# Patient Record
Sex: MALE | Race: WHITE | NOT HISPANIC OR LATINO | Employment: FULL TIME | URBAN - METROPOLITAN AREA
[De-identification: names, ages, dates, MRNs, and addresses within clinical notes are randomized per-mention and may not be internally consistent; named-entity substitution may affect disease eponyms.]

---

## 2018-04-11 ENCOUNTER — OFFICE VISIT (OUTPATIENT)
Dept: URGENT CARE | Facility: CLINIC | Age: 38
End: 2018-04-11
Payer: COMMERCIAL

## 2018-04-11 VITALS
HEART RATE: 74 BPM | SYSTOLIC BLOOD PRESSURE: 102 MMHG | WEIGHT: 158 LBS | TEMPERATURE: 98.2 F | DIASTOLIC BLOOD PRESSURE: 72 MMHG | HEIGHT: 72 IN | BODY MASS INDEX: 21.4 KG/M2 | RESPIRATION RATE: 14 BRPM | OXYGEN SATURATION: 98 %

## 2018-04-11 DIAGNOSIS — B96.89 ACUTE BACTERIAL RHINOSINUSITIS: ICD-10-CM

## 2018-04-11 DIAGNOSIS — J01.90 ACUTE BACTERIAL RHINOSINUSITIS: ICD-10-CM

## 2018-04-11 PROCEDURE — 99203 OFFICE O/P NEW LOW 30 MIN: CPT | Performed by: EMERGENCY MEDICINE

## 2018-04-11 RX ORDER — AMOXICILLIN AND CLAVULANATE POTASSIUM 875; 125 MG/1; MG/1
1 TABLET, FILM COATED ORAL 2 TIMES DAILY
Qty: 14 TAB | Refills: 0 | Status: SHIPPED | OUTPATIENT
Start: 2018-04-11 | End: 2018-04-18

## 2018-04-11 ASSESSMENT — ENCOUNTER SYMPTOMS
ABDOMINAL PAIN: 0
NAUSEA: 0
VOMITING: 0
SINUS PRESSURE: 1
DIARRHEA: 0
SORE THROAT: 0
COUGH: 0
SHORTNESS OF BREATH: 0
SINUS PAIN: 1
HEADACHES: 0

## 2018-04-11 NOTE — PROGRESS NOTES
Subjective:      Lyndon Sigala is a 38 y.o. male who presents with Sinus Problem            Sinus Problem   This is a recurrent problem. The current episode started more than 1 month ago. The problem has been rapidly worsening since onset. There has been no fever. The pain is moderate. Associated symptoms include congestion and sinus pressure. Pertinent negatives include no coughing, ear pain, headaches, shortness of breath, sneezing or sore throat. Past treatments include nothing.   Notes multiple coryza illnesses over the last several weeks; worsened the last several days    Review of Systems   Constitutional: Positive for malaise/fatigue.   HENT: Positive for congestion, nosebleeds, sinus pain and sinus pressure. Negative for ear pain, sneezing and sore throat.         Purulent nasal discharge noted.   Respiratory: Negative for cough and shortness of breath.    Cardiovascular: Negative for chest pain.   Gastrointestinal: Negative for abdominal pain, diarrhea, nausea and vomiting.   Skin: Negative for rash.   Neurological: Negative for headaches.   Endo/Heme/Allergies: Negative for environmental allergies.     PMH:  has no past medical history on file.  MEDS:   Current Outpatient Prescriptions:   •  amoxicillin-clavulanate (AUGMENTIN) 875-125 MG Tab, Take 1 Tab by mouth 2 times a day for 7 days., Disp: 14 Tab, Rfl: 0  •  azithromycin (ZITHROMAX) 250 MG Tab, Take as directed, Disp: 6 Tab, Rfl: 0  •  neomycin sulf/polymyx B sulf/HC soln (CORTISPORIN HC SOL) 3.5-55319-5 SOLN, Place 4-6 Drops in left ear 4 times a day. In affected ear, Disp: 10 mL, Rfl: 0  •  ibuprofen (MOTRIN) 200 MG TABS, Take 600 mg by mouth every 6 hours as needed., Disp: , Rfl:   •  pseudoephedrine-guaifenesin (MUCINEX D)  MG per tablet, Take 1 Tab by mouth every 12 hours., Disp: 14 Tab, Rfl: 0  •  naproxen (NAPROSYN) 500 MG TABS, Take 1 Tab by mouth 2 times a day, with meals., Disp: 24 Each, Rfl: 0  ALLERGIES: No Known  Allergies  SURGHX: History reviewed. No pertinent surgical history.  SOCHX:  reports that he has never smoked. He does not have any smokeless tobacco history on file.  FH: family history is not on file.       Objective:     /72   Pulse 74   Temp 36.8 °C (98.2 °F)   Resp 14   Ht 1.829 m (6')   Wt 71.7 kg (158 lb)   SpO2 98%   BMI 21.43 kg/m²      Physical Exam   Constitutional: He appears well-developed and well-nourished. He is cooperative.  Non-toxic appearance. He does not appear ill. No distress.   HENT:   Head: Normocephalic.   Right Ear: Tympanic membrane and ear canal normal.   Left Ear: Tympanic membrane and ear canal normal.   Nose: Mucosal edema and rhinorrhea present.   Mouth/Throat: Uvula is midline and mucous membranes are normal. No trismus in the jaw. Oropharyngeal exudate present. No posterior oropharyngeal edema or posterior oropharyngeal erythema.   Eyes: Conjunctivae are normal.   Neck: Trachea normal. Neck supple.   Cardiovascular: Normal rate, regular rhythm and normal heart sounds.    Pulmonary/Chest: Effort normal and breath sounds normal.   Lymphadenopathy:     He has no cervical adenopathy.   Neurological: He is alert.   Skin: Skin is warm and dry.   Psychiatric: He has a normal mood and affect.               Assessment/Plan:     1. Acute bacterial rhinosinusitis  Rx Augmentin  Recommended supportive care measures, including rest, increasing oral fluid intake and use of over-the-counter medications for relief of symptoms.

## 2018-04-11 NOTE — PATIENT INSTRUCTIONS
Use behind-the-counter pseudoephedrine (Sudafed) oral decongestant as needed; avoid bedtime use.  Use over-the-counter oxymetazoline (Afrin) nasal spray as needed for up to 3 days only; follow package instructions for dosing.  Use an oral probiotic daily, such as Culturelle, Align, or yogurt to reduce gastrointestinal symptoms.  Use over-the-counter pain reliever, such as acetaminophen (Tylenol), ibuprofen (Advil, Motrin) or naproxen (Aleve) as needed; follow package directions for dosing.  Sinusitis, Adult  Sinusitis is soreness and inflammation of your sinuses. Sinuses are hollow spaces in the bones around your face. Your sinuses are located:  · Around your eyes.  · In the middle of your forehead.  · Behind your nose.  · In your cheekbones.  Your sinuses and nasal passages are lined with a stringy fluid (mucus). Mucus normally drains out of your sinuses. When your nasal tissues become inflamed or swollen, the mucus can become trapped or blocked so air cannot flow through your sinuses. This allows bacteria, viruses, and funguses to grow, which leads to infection.  Sinusitis can develop quickly and last for 7?10 days (acute) or for more than 12 weeks (chronic). Sinusitis often develops after a cold.  What are the causes?  This condition is caused by anything that creates swelling in the sinuses or stops mucus from draining, including:  · Allergies.  · Asthma.  · Bacterial or viral infection.  · Abnormally shaped bones between the nasal passages.  · Nasal growths that contain mucus (nasal polyps).  · Narrow sinus openings.  · Pollutants, such as chemicals or irritants in the air.  · A foreign object stuck in the nose.  · A fungal infection. This is rare.  What increases the risk?  The following factors may make you more likely to develop this condition:  · Having allergies or asthma.  · Having had a recent cold or respiratory tract infection.  · Having structural deformities or blockages in your nose or  sinuses.  · Having a weak immune system.  · Doing a lot of swimming or diving.  · Overusing nasal sprays.  · Smoking.  What are the signs or symptoms?  The main symptoms of this condition are pain and a feeling of pressure around the affected sinuses. Other symptoms include:  · Upper toothache.  · Earache.  · Headache.  · Bad breath.  · Decreased sense of smell and taste.  · A cough that may get worse at night.  · Fatigue.  · Fever.  · Thick drainage from your nose. The drainage is often green and it may contain pus (purulent).  · Stuffy nose or congestion.  · Postnasal drip. This is when extra mucus collects in the throat or back of the nose.  · Swelling and warmth over the affected sinuses.  · Sore throat.  · Sensitivity to light.  How is this diagnosed?  This condition is diagnosed based on symptoms, a medical history, and a physical exam. To find out if your condition is acute or chronic, your health care provider may:  · Look in your nose for signs of nasal polyps.  · Tap over the affected sinus to check for signs of infection.  · View the inside of your sinuses using an imaging device that has a light attached (endoscope).  If your health care provider suspects that you have chronic sinusitis, you may also:  · Be tested for allergies.  · Have a sample of mucus taken from your nose (nasal culture) and checked for bacteria.  · Have a mucus sample examined to see if your sinusitis is related to an allergy.  If your sinusitis does not respond to treatment and it lasts longer than 8 weeks, you may have an MRI or CT scan to check your sinuses. These scans also help to determine how severe your infection is.  In rare cases, a bone biopsy may be done to rule out more serious types of fungal sinus disease.  How is this treated?  Treatment for sinusitis depends on the cause and whether your condition is chronic or acute. If a virus is causing your sinusitis, your symptoms will go away on their own within 10 days. You  may be given medicines to relieve your symptoms, including:  · Topical nasal decongestants. They shrink swollen nasal passages and let mucus drain from your sinuses.  · Antihistamines. These drugs block inflammation that is triggered by allergies. This can help to ease swelling in your nose and sinuses.  · Topical nasal corticosteroids. These are nasal sprays that ease inflammation and swelling in your nose and sinuses.  · Nasal saline washes. These rinses can help to get rid of thick mucus in your nose.  If your condition is caused by bacteria, you will be given an antibiotic medicine. If your condition is caused by a fungus, you will be given an antifungal medicine.  Surgery may be needed to correct underlying conditions, such as narrow nasal passages. Surgery may also be needed to remove polyps.  Follow these instructions at home:  Medicines  · Take, use, or apply over-the-counter and prescription medicines only as told by your health care provider. These may include nasal sprays.  · If you were prescribed an antibiotic medicine, take it as told by your health care provider. Do not stop taking the antibiotic even if you start to feel better.  Hydrate and Humidify  · Drink enough water to keep your urine clear or pale yellow. Staying hydrated will help to thin your mucus.  · Use a cool mist humidifier to keep the humidity level in your home above 50%.  · Inhale steam for 10-15 minutes, 3-4 times a day or as told by your health care provider. You can do this in the bathroom while a hot shower is running.  · Limit your exposure to cool or dry air.  Rest  · Rest as much as possible.  · Sleep with your head raised (elevated).  · Make sure to get enough sleep each night.  General instructions  · Apply a warm, moist washcloth to your face 3-4 times a day or as told by your health care provider. This will help with discomfort.  · Wash your hands often with soap and water to reduce your exposure to viruses and other  germs. If soap and water are not available, use hand .  · Do not smoke. Avoid being around people who are smoking (secondhand smoke).  · Keep all follow-up visits as told by your health care provider. This is important.  Contact a health care provider if:  · You have a fever.  · Your symptoms get worse.  · Your symptoms do not improve within 10 days.  Get help right away if:  · You have a severe headache.  · You have persistent vomiting.  · You have pain or swelling around your face or eyes.  · You have vision problems.  · You develop confusion.  · Your neck is stiff.  · You have trouble breathing.  This information is not intended to replace advice given to you by your health care provider. Make sure you discuss any questions you have with your health care provider.  Document Released: 12/18/2006 Document Revised: 08/13/2017 Document Reviewed: 10/12/2016  [employer] Interactive Patient Education © 2017 [employer]

## 2018-09-16 ENCOUNTER — OFFICE VISIT (OUTPATIENT)
Dept: URGENT CARE | Facility: CLINIC | Age: 38
End: 2018-09-16
Payer: COMMERCIAL

## 2018-09-16 VITALS
RESPIRATION RATE: 14 BRPM | HEIGHT: 72 IN | HEART RATE: 52 BPM | SYSTOLIC BLOOD PRESSURE: 114 MMHG | WEIGHT: 160 LBS | BODY MASS INDEX: 21.67 KG/M2 | DIASTOLIC BLOOD PRESSURE: 78 MMHG | TEMPERATURE: 98.1 F | OXYGEN SATURATION: 97 %

## 2018-09-16 DIAGNOSIS — H93.8X2 SENSATION OF FULLNESS IN LEFT EAR: ICD-10-CM

## 2018-09-16 DIAGNOSIS — H61.22 IMPACTED CERUMEN OF LEFT EAR: ICD-10-CM

## 2018-09-16 PROCEDURE — 99213 OFFICE O/P EST LOW 20 MIN: CPT | Performed by: NURSE PRACTITIONER

## 2018-09-16 ASSESSMENT — ENCOUNTER SYMPTOMS
DIZZINESS: 0
CHILLS: 0
NAUSEA: 0
FEVER: 0
VOMITING: 0

## 2018-09-16 NOTE — PROGRESS NOTES
Subjective:      Lyndon Sigala is a 38 y.o. male who presents with Ear Fullness (x1mo left ear fullness, muffled )    History reviewed. No pertinent past medical history.  Social History     Social History   • Marital status: Single     Spouse name: N/A   • Number of children: N/A   • Years of education: N/A     Occupational History   • Not on file.     Social History Main Topics   • Smoking status: Former Smoker     Types: Cigarettes     Quit date: 9/16/2012   • Smokeless tobacco: Never Used   • Alcohol use Not on file   • Drug use: Unknown   • Sexual activity: Not on file     Other Topics Concern   • Not on file     Social History Narrative   • No narrative on file     History reviewed. No pertinent family history.    Allergies: Patient has no known allergies.            Ear Fullness   This is a recurrent problem. The current episode started in the past 7 days. The problem occurs constantly. The problem has been unchanged. Pertinent negatives include no chills, fever, nausea or vomiting. Associated symptoms comments: Left ear fullness . Nothing aggravates the symptoms. He has tried nothing for the symptoms. The treatment provided no relief.       Review of Systems   Constitutional: Negative for chills, fever and malaise/fatigue.   HENT: Positive for hearing loss. Negative for ear pain.    Gastrointestinal: Negative for nausea and vomiting.   Skin: Negative.    Neurological: Negative for dizziness.   All other systems reviewed and are negative.         Objective:     /78   Pulse (!) 52   Temp 36.7 °C (98.1 °F)   Resp 14   Ht 1.829 m (6')   Wt 72.6 kg (160 lb)   SpO2 97%   BMI 21.70 kg/m²      Physical Exam   Constitutional: He appears well-developed and well-nourished. No distress.   HENT:   Head: Normocephalic.   Right Ear: External ear normal.   Left EAC is filled with impacted cerumen   Eyes: Pupils are equal, round, and reactive to light. EOM are normal.   Neck: Normal range of motion. Neck  supple.   Skin: He is not diaphoretic.   Vitals reviewed.    Post lavage: large amount of cerumen removed from the left EAC; TM is clear, no erythema noted.          Assessment/Plan:     1. Sensation of fullness in left ear  2. Impacted cerumen of left ear  -Follow up as needed for recurrent symptoms.

## 2020-02-11 ENCOUNTER — OFFICE VISIT (OUTPATIENT)
Dept: URGENT CARE | Facility: CLINIC | Age: 40
End: 2020-02-11
Payer: COMMERCIAL

## 2020-02-11 VITALS
DIASTOLIC BLOOD PRESSURE: 78 MMHG | BODY MASS INDEX: 22.35 KG/M2 | RESPIRATION RATE: 17 BRPM | HEART RATE: 98 BPM | HEIGHT: 72 IN | WEIGHT: 165 LBS | SYSTOLIC BLOOD PRESSURE: 104 MMHG | OXYGEN SATURATION: 98 % | TEMPERATURE: 98.6 F

## 2020-02-11 DIAGNOSIS — J10.1 INFLUENZA A: Primary | ICD-10-CM

## 2020-02-11 DIAGNOSIS — J06.9 URI WITH COUGH AND CONGESTION: ICD-10-CM

## 2020-02-11 LAB
FLUAV+FLUBV AG SPEC QL IA: NORMAL
INT CON NEG: NORMAL
INT CON POS: NORMAL

## 2020-02-11 PROCEDURE — 87804 INFLUENZA ASSAY W/OPTIC: CPT | Performed by: PHYSICIAN ASSISTANT

## 2020-02-11 PROCEDURE — 99214 OFFICE O/P EST MOD 30 MIN: CPT | Performed by: PHYSICIAN ASSISTANT

## 2020-02-11 RX ORDER — METHYLPREDNISOLONE 4 MG/1
TABLET ORAL
Qty: 21 TAB | Refills: 0 | Status: ON HOLD | OUTPATIENT
Start: 2020-02-11 | End: 2020-09-15

## 2020-02-11 RX ORDER — OSELTAMIVIR PHOSPHATE 75 MG/1
75 CAPSULE ORAL 2 TIMES DAILY
Qty: 10 CAP | Refills: 0 | Status: SHIPPED | OUTPATIENT
Start: 2020-02-11 | End: 2020-02-16

## 2020-02-11 RX ORDER — PROMETHAZINE HYDROCHLORIDE AND CODEINE PHOSPHATE 6.25; 1 MG/5ML; MG/5ML
5 SYRUP ORAL 4 TIMES DAILY PRN
Qty: 120 ML | Refills: 0 | Status: SHIPPED | OUTPATIENT
Start: 2020-02-11 | End: 2020-02-18

## 2020-02-11 NOTE — PROGRESS NOTES
Subjective:      Pt is a 39 y.o. male who presents with Fever (fevers x 2days runny nose , post nasal drip )            HPI  This is a new problem. PT presents to  clinic today complaining of sore throat, fevers, chills, body aches, watery eyes, pressure in ears, cough, fatigue, runny nose. PT denies CP, SOB, NVD, abdominal pain, joint pain. PT states these symptoms began around 2 days ago. PT states the pain is a 7/10, aching in nature and worse at night.  Pt has not taken any RX medications for this condition. The pt's medication list, problem list, and allergies have been evaluated and reviewed during today's visit.      PMH:  Negative per pt.      PSH:  Negative per pt.      Fam Hx:  the patient's family history is not pertinent to their current complaint    Soc HX:  Social History     Socioeconomic History   • Marital status: Single     Spouse name: Not on file   • Number of children: Not on file   • Years of education: Not on file   • Highest education level: Not on file   Occupational History   • Not on file   Social Needs   • Financial resource strain: Not on file   • Food insecurity:     Worry: Not on file     Inability: Not on file   • Transportation needs:     Medical: Not on file     Non-medical: Not on file   Tobacco Use   • Smoking status: Former Smoker     Packs/day: 0.00     Types: Cigarettes     Last attempt to quit: 2012     Years since quittin.4   • Smokeless tobacco: Never Used   Substance and Sexual Activity   • Alcohol use: Not on file   • Drug use: Not on file   • Sexual activity: Not on file   Lifestyle   • Physical activity:     Days per week: Not on file     Minutes per session: Not on file   • Stress: Not on file   Relationships   • Social connections:     Talks on phone: Not on file     Gets together: Not on file     Attends Yazidism service: Not on file     Active member of club or organization: Not on file     Attends meetings of clubs or organizations: Not on file      Relationship status: Not on file   • Intimate partner violence:     Fear of current or ex partner: Not on file     Emotionally abused: Not on file     Physically abused: Not on file     Forced sexual activity: Not on file   Other Topics Concern   • Not on file   Social History Narrative   • Not on file         Medications:    Current Outpatient Medications:   •  ibuprofen (MOTRIN) 200 MG TABS, Take 600 mg by mouth every 6 hours as needed., Disp: , Rfl:       Allergies:  Patient has no known allergies.    ROS  Constitutional: Positive for chills, fevers, body aches and malaise/fatigue.   HENT: Positive for congestion and sore throat. Negative for ear pain.    Eyes: Negative for blurred vision, double vision and photophobia.   Respiratory: Positive for cough and sputum production. Negative for hemoptysis, shortness of breath and wheezing.    Cardiovascular: Negative for chest pain and palpitations.   Gastrointestinal: Negative for nausea, vomiting, abdominal pain, diarrhea and constipation.   Genitourinary: Negative for dysuria and flank pain.   Musculoskeletal: Negative for falls and myalgias.   Skin: Negative for itching and rash.   Neurological: Positive for headaches. Negative for dizziness and tingling.   Endo/Heme/Allergies: Does not bruise/bleed easily.   Psychiatric/Behavioral: Negative for depression. The patient is not nervous/anxious.             Objective:     /78 (BP Location: Left arm, Patient Position: Sitting, BP Cuff Size: Adult)   Pulse 98   Temp 37 °C (98.6 °F) (Temporal)   Resp 17   Ht 1.829 m (6')   Wt 74.8 kg (165 lb)   SpO2 98%   BMI 22.38 kg/m²      Physical Exam  Constitutional: PT is oriented to person, place, and time. PT appears well-developed and well-nourished. No distress.   HENT:   Head: Normocephalic and atraumatic.   Right Ear: Hearing, tympanic membrane, external ear and ear canal normal.   Left Ear: Hearing, tympanic membrane, external ear and ear canal normal.   Nose:  Mucosal edema, rhinorrhea and sinus tenderness present. Right sinus exhibits frontal sinus tenderness. Left sinus exhibits frontal sinus tenderness.   Mouth/Throat: Uvula is midline. Mucous membranes are pale. Posterior oropharyngeal edema and posterior oropharyngeal erythema with exudate noted on exam.   Eyes: Conjunctivae normal and EOM are normal. Pupils are equal, round, and reactive to light.   Neck: Normal range of motion. Neck supple. No thyromegaly present.   Cardiovascular: Normal rate, regular rhythm, normal heart sounds and intact distal pulses.  Exam reveals no gallop and no friction rub.    No murmur heard.  Pulmonary/Chest: Effort normal and breath sounds normal. No respiratory distress. PT has no wheezes. PT has no rales. PT exhibits no tenderness.   Abdominal: Soft. Bowel sounds are normal. PT exhibits no distension and no mass. There is no tenderness. There is no rebound and no guarding.   Musculoskeletal: Normal range of motion. PT exhibits no edema and no tenderness.   Lymphadenopathy:     PT has no cervical adenopathy.   Neurological: PT is alert and oriented to person, place, and time. PT displays normal reflexes. No cranial nerve deficit. PT exhibits normal muscle tone. Coordination normal.   Skin: Skin is warm and dry. No rash noted. No erythema.   Psychiatric: PT has a normal mood and affect. PT behavior is normal. Judgment and thought content normal.        Assessment/Plan:       1. Influenza A    2. URI with cough and congestion    - POCT Influenza A/B-->POS A    Tamiflu  Medrol pack  Codeine cough syrup  Concern for worsening symptoms of URI which shortly could transition to pneumonia and worsening sinus congestion and infection with powerful cough keeping pt up at night as they must sleep upright to avoid coughing fits.  Diff DX: Bronchitis, Sinusitis, Pneumonia, Influenza, Viral URI, Allergies  Rest, fluids encouraged.  OTC decongestant for congestion/cough  AVS with medical info  given.  Pt was in full understanding and agreement with the plan.  Differential diagnosis, natural history, supportive care, and indications for immediate follow-up discussed. All questions answered. Patient agrees with the plan of care.  Follow-up as needed if symptoms worsen or fail to improve to PCP, Urgent care or Emergency Room.

## 2020-09-11 ENCOUNTER — HOSPITAL ENCOUNTER (INPATIENT)
Facility: MEDICAL CENTER | Age: 40
LOS: 4 days | DRG: 165 | End: 2020-09-15
Attending: EMERGENCY MEDICINE | Admitting: SURGERY
Payer: COMMERCIAL

## 2020-09-11 ENCOUNTER — APPOINTMENT (OUTPATIENT)
Dept: RADIOLOGY | Facility: IMAGING CENTER | Age: 40
End: 2020-09-11
Attending: FAMILY MEDICINE
Payer: COMMERCIAL

## 2020-09-11 ENCOUNTER — APPOINTMENT (OUTPATIENT)
Dept: RADIOLOGY | Facility: MEDICAL CENTER | Age: 40
DRG: 165 | End: 2020-09-11
Attending: SURGERY
Payer: COMMERCIAL

## 2020-09-11 ENCOUNTER — OFFICE VISIT (OUTPATIENT)
Dept: URGENT CARE | Facility: CLINIC | Age: 40
End: 2020-09-11
Payer: COMMERCIAL

## 2020-09-11 VITALS
DIASTOLIC BLOOD PRESSURE: 78 MMHG | HEIGHT: 72 IN | OXYGEN SATURATION: 93 % | BODY MASS INDEX: 17.07 KG/M2 | RESPIRATION RATE: 16 BRPM | TEMPERATURE: 97.3 F | HEART RATE: 92 BPM | WEIGHT: 126 LBS | SYSTOLIC BLOOD PRESSURE: 118 MMHG

## 2020-09-11 DIAGNOSIS — R06.02 SHORTNESS OF BREATH: ICD-10-CM

## 2020-09-11 DIAGNOSIS — J93.83 SPONTANEOUS PNEUMOTHORAX: ICD-10-CM

## 2020-09-11 LAB
ALBUMIN SERPL BCP-MCNC: 5.1 G/DL (ref 3.2–4.9)
ALBUMIN/GLOB SERPL: 1.8 G/DL
ALP SERPL-CCNC: 57 U/L (ref 30–99)
ALT SERPL-CCNC: 13 U/L (ref 2–50)
ANION GAP SERPL CALC-SCNC: 15 MMOL/L (ref 7–16)
APTT PPP: 26.3 SEC (ref 24.7–36)
AST SERPL-CCNC: 13 U/L (ref 12–45)
BASOPHILS # BLD AUTO: 0.7 % (ref 0–1.8)
BASOPHILS # BLD: 0.05 K/UL (ref 0–0.12)
BILIRUB SERPL-MCNC: 0.8 MG/DL (ref 0.1–1.5)
BUN SERPL-MCNC: 17 MG/DL (ref 8–22)
CALCIUM SERPL-MCNC: 10.2 MG/DL (ref 8.5–10.5)
CHLORIDE SERPL-SCNC: 103 MMOL/L (ref 96–112)
CO2 SERPL-SCNC: 23 MMOL/L (ref 20–33)
COVID ORDER STATUS COVID19: NORMAL
CREAT SERPL-MCNC: 1.18 MG/DL (ref 0.5–1.4)
EKG IMPRESSION: NORMAL
EOSINOPHIL # BLD AUTO: 0.17 K/UL (ref 0–0.51)
EOSINOPHIL NFR BLD: 2.5 % (ref 0–6.9)
ERYTHROCYTE [DISTWIDTH] IN BLOOD BY AUTOMATED COUNT: 39.5 FL (ref 35.9–50)
GLOBULIN SER CALC-MCNC: 2.9 G/DL (ref 1.9–3.5)
GLUCOSE SERPL-MCNC: 106 MG/DL (ref 65–99)
HCT VFR BLD AUTO: 48.6 % (ref 42–52)
HGB BLD-MCNC: 17.1 G/DL (ref 14–18)
IMM GRANULOCYTES # BLD AUTO: 0.02 K/UL (ref 0–0.11)
IMM GRANULOCYTES NFR BLD AUTO: 0.3 % (ref 0–0.9)
INR PPP: 0.97 (ref 0.87–1.13)
LYMPHOCYTES # BLD AUTO: 2.23 K/UL (ref 1–4.8)
LYMPHOCYTES NFR BLD: 32.9 % (ref 22–41)
MCH RBC QN AUTO: 31.5 PG (ref 27–33)
MCHC RBC AUTO-ENTMCNC: 35.2 G/DL (ref 33.7–35.3)
MCV RBC AUTO: 89.5 FL (ref 81.4–97.8)
MONOCYTES # BLD AUTO: 0.41 K/UL (ref 0–0.85)
MONOCYTES NFR BLD AUTO: 6.1 % (ref 0–13.4)
NEUTROPHILS # BLD AUTO: 3.89 K/UL (ref 1.82–7.42)
NEUTROPHILS NFR BLD: 57.5 % (ref 44–72)
NRBC # BLD AUTO: 0 K/UL
NRBC BLD-RTO: 0 /100 WBC
PLATELET # BLD AUTO: 268 K/UL (ref 164–446)
PMV BLD AUTO: 10.1 FL (ref 9–12.9)
POTASSIUM SERPL-SCNC: 4.2 MMOL/L (ref 3.6–5.5)
PROT SERPL-MCNC: 8 G/DL (ref 6–8.2)
PROTHROMBIN TIME: 13.2 SEC (ref 12–14.6)
RBC # BLD AUTO: 5.43 M/UL (ref 4.7–6.1)
SARS-COV-2 RNA RESP QL NAA+PROBE: NOTDETECTED
SODIUM SERPL-SCNC: 141 MMOL/L (ref 135–145)
SPECIMEN SOURCE: NORMAL
WBC # BLD AUTO: 6.8 K/UL (ref 4.8–10.8)

## 2020-09-11 PROCEDURE — 0W9930Z DRAINAGE OF RIGHT PLEURAL CAVITY WITH DRAINAGE DEVICE, PERCUTANEOUS APPROACH: ICD-10-PCS | Performed by: SURGERY

## 2020-09-11 PROCEDURE — U0003 INFECTIOUS AGENT DETECTION BY NUCLEIC ACID (DNA OR RNA); SEVERE ACUTE RESPIRATORY SYNDROME CORONAVIRUS 2 (SARS-COV-2) (CORONAVIRUS DISEASE [COVID-19]), AMPLIFIED PROBE TECHNIQUE, MAKING USE OF HIGH THROUGHPUT TECHNOLOGIES AS DESCRIBED BY CMS-2020-01-R: HCPCS

## 2020-09-11 PROCEDURE — 36415 COLL VENOUS BLD VENIPUNCTURE: CPT

## 2020-09-11 PROCEDURE — 71046 X-RAY EXAM CHEST 2 VIEWS: CPT | Mod: TC | Performed by: FAMILY MEDICINE

## 2020-09-11 PROCEDURE — 770006 HCHG ROOM/CARE - MED/SURG/GYN SEMI*

## 2020-09-11 PROCEDURE — 99285 EMERGENCY DEPT VISIT HI MDM: CPT

## 2020-09-11 PROCEDURE — 700111 HCHG RX REV CODE 636 W/ 250 OVERRIDE (IP): Performed by: EMERGENCY MEDICINE

## 2020-09-11 PROCEDURE — 32551 INSERTION OF CHEST TUBE: CPT

## 2020-09-11 PROCEDURE — 85610 PROTHROMBIN TIME: CPT

## 2020-09-11 PROCEDURE — 96376 TX/PRO/DX INJ SAME DRUG ADON: CPT

## 2020-09-11 PROCEDURE — 85025 COMPLETE CBC W/AUTO DIFF WBC: CPT

## 2020-09-11 PROCEDURE — 85730 THROMBOPLASTIN TIME PARTIAL: CPT

## 2020-09-11 PROCEDURE — 96375 TX/PRO/DX INJ NEW DRUG ADDON: CPT

## 2020-09-11 PROCEDURE — 700111 HCHG RX REV CODE 636 W/ 250 OVERRIDE (IP): Performed by: SURGERY

## 2020-09-11 PROCEDURE — 93005 ELECTROCARDIOGRAM TRACING: CPT

## 2020-09-11 PROCEDURE — 93005 ELECTROCARDIOGRAM TRACING: CPT | Performed by: EMERGENCY MEDICINE

## 2020-09-11 PROCEDURE — 71250 CT THORAX DX C-: CPT

## 2020-09-11 PROCEDURE — C9803 HOPD COVID-19 SPEC COLLECT: HCPCS | Performed by: EMERGENCY MEDICINE

## 2020-09-11 PROCEDURE — 99214 OFFICE O/P EST MOD 30 MIN: CPT | Performed by: FAMILY MEDICINE

## 2020-09-11 PROCEDURE — 80053 COMPREHEN METABOLIC PANEL: CPT

## 2020-09-11 PROCEDURE — 96374 THER/PROPH/DIAG INJ IV PUSH: CPT

## 2020-09-11 RX ORDER — AMOXICILLIN 250 MG
1 CAPSULE ORAL NIGHTLY
Status: DISCONTINUED | OUTPATIENT
Start: 2020-09-12 | End: 2020-09-15 | Stop reason: HOSPADM

## 2020-09-11 RX ORDER — SODIUM CHLORIDE 9 MG/ML
INJECTION, SOLUTION INTRAVENOUS CONTINUOUS
Status: DISCONTINUED | OUTPATIENT
Start: 2020-09-11 | End: 2020-09-15 | Stop reason: HOSPADM

## 2020-09-11 RX ORDER — MORPHINE SULFATE 4 MG/ML
4 INJECTION, SOLUTION INTRAMUSCULAR; INTRAVENOUS ONCE
Status: COMPLETED | OUTPATIENT
Start: 2020-09-11 | End: 2020-09-11

## 2020-09-11 RX ORDER — DOCUSATE SODIUM 100 MG/1
100 CAPSULE, LIQUID FILLED ORAL 2 TIMES DAILY
Status: DISCONTINUED | OUTPATIENT
Start: 2020-09-12 | End: 2020-09-15 | Stop reason: HOSPADM

## 2020-09-11 RX ORDER — LORAZEPAM 2 MG/ML
0.5 INJECTION INTRAMUSCULAR ONCE
Status: COMPLETED | OUTPATIENT
Start: 2020-09-11 | End: 2020-09-11

## 2020-09-11 RX ORDER — BISACODYL 10 MG
10 SUPPOSITORY, RECTAL RECTAL
Status: DISCONTINUED | OUTPATIENT
Start: 2020-09-11 | End: 2020-09-15 | Stop reason: HOSPADM

## 2020-09-11 RX ORDER — ONDANSETRON 2 MG/ML
4 INJECTION INTRAMUSCULAR; INTRAVENOUS EVERY 4 HOURS PRN
Status: DISCONTINUED | OUTPATIENT
Start: 2020-09-11 | End: 2020-09-15 | Stop reason: HOSPADM

## 2020-09-11 RX ORDER — POLYETHYLENE GLYCOL 3350 17 G/17G
1 POWDER, FOR SOLUTION ORAL 2 TIMES DAILY
Status: DISCONTINUED | OUTPATIENT
Start: 2020-09-12 | End: 2020-09-15 | Stop reason: HOSPADM

## 2020-09-11 RX ORDER — MORPHINE SULFATE 4 MG/ML
2 INJECTION, SOLUTION INTRAMUSCULAR; INTRAVENOUS
Status: DISCONTINUED | OUTPATIENT
Start: 2020-09-11 | End: 2020-09-15 | Stop reason: HOSPADM

## 2020-09-11 RX ORDER — OXYCODONE HYDROCHLORIDE 5 MG/1
5 TABLET ORAL
Status: DISCONTINUED | OUTPATIENT
Start: 2020-09-11 | End: 2020-09-15 | Stop reason: HOSPADM

## 2020-09-11 RX ORDER — AMOXICILLIN 250 MG
1 CAPSULE ORAL
Status: DISCONTINUED | OUTPATIENT
Start: 2020-09-11 | End: 2020-09-15 | Stop reason: HOSPADM

## 2020-09-11 RX ORDER — IBUPROFEN 800 MG/1
800 TABLET ORAL 3 TIMES DAILY
Status: DISCONTINUED | OUTPATIENT
Start: 2020-09-12 | End: 2020-09-15 | Stop reason: HOSPADM

## 2020-09-11 RX ORDER — ACETAMINOPHEN 500 MG
1000 TABLET ORAL EVERY 6 HOURS
Status: DISCONTINUED | OUTPATIENT
Start: 2020-09-12 | End: 2020-09-15 | Stop reason: HOSPADM

## 2020-09-11 RX ORDER — HEPARIN SODIUM 5000 [USP'U]/ML
5000 INJECTION, SOLUTION INTRAVENOUS; SUBCUTANEOUS EVERY 8 HOURS
Status: DISCONTINUED | OUTPATIENT
Start: 2020-09-12 | End: 2020-09-15 | Stop reason: HOSPADM

## 2020-09-11 RX ORDER — ENEMA 19; 7 G/133ML; G/133ML
1 ENEMA RECTAL
Status: DISCONTINUED | OUTPATIENT
Start: 2020-09-11 | End: 2020-09-15 | Stop reason: HOSPADM

## 2020-09-11 RX ADMIN — LORAZEPAM 0.5 MG: 2 INJECTION INTRAMUSCULAR; INTRAVENOUS at 20:49

## 2020-09-11 RX ADMIN — MORPHINE SULFATE 4 MG: 4 INJECTION INTRAVENOUS at 20:49

## 2020-09-11 RX ADMIN — MORPHINE SULFATE 4 MG: 4 INJECTION INTRAVENOUS at 23:29

## 2020-09-11 ASSESSMENT — COGNITIVE AND FUNCTIONAL STATUS - GENERAL
SUGGESTED CMS G CODE MODIFIER DAILY ACTIVITY: CH
SUGGESTED CMS G CODE MODIFIER MOBILITY: CH
MOBILITY SCORE: 24
DAILY ACTIVITIY SCORE: 24

## 2020-09-11 ASSESSMENT — PATIENT HEALTH QUESTIONNAIRE - PHQ9
2. FEELING DOWN, DEPRESSED, IRRITABLE, OR HOPELESS: NOT AT ALL
1. LITTLE INTEREST OR PLEASURE IN DOING THINGS: NOT AT ALL
SUM OF ALL RESPONSES TO PHQ9 QUESTIONS 1 AND 2: 0
2. FEELING DOWN, DEPRESSED, IRRITABLE, OR HOPELESS: NOT AT ALL
SUM OF ALL RESPONSES TO PHQ9 QUESTIONS 1 AND 2: 0
1. LITTLE INTEREST OR PLEASURE IN DOING THINGS: NOT AT ALL

## 2020-09-11 ASSESSMENT — ENCOUNTER SYMPTOMS
VOMITING: 0
COUGH: 1
SORE THROAT: 0
SHORTNESS OF BREATH: 0
FEVER: 0

## 2020-09-11 ASSESSMENT — FIBROSIS 4 INDEX: FIB4 SCORE: 0.54

## 2020-09-11 ASSESSMENT — LIFESTYLE VARIABLES
TOTAL SCORE: 0
AVERAGE NUMBER OF DAYS PER WEEK YOU HAVE A DRINK CONTAINING ALCOHOL: 2
ON A TYPICAL DAY WHEN YOU DRINK ALCOHOL HOW MANY DRINKS DO YOU HAVE: 3
HAVE YOU EVER FELT YOU SHOULD CUT DOWN ON YOUR DRINKING: NO
DO YOU DRINK ALCOHOL: NO
EVER HAD A DRINK FIRST THING IN THE MORNING TO STEADY YOUR NERVES TO GET RID OF A HANGOVER: NO
EVER FELT BAD OR GUILTY ABOUT YOUR DRINKING: NO
HAVE PEOPLE ANNOYED YOU BY CRITICIZING YOUR DRINKING: NO
HOW MANY TIMES IN THE PAST YEAR HAVE YOU HAD 5 OR MORE DRINKS IN A DAY: 7
TOTAL SCORE: 0
ALCOHOL_USE: NO
TOTAL SCORE: 0
DOES PATIENT WANT TO STOP DRINKING: NO
CONSUMPTION TOTAL: POSITIVE

## 2020-09-12 ENCOUNTER — APPOINTMENT (OUTPATIENT)
Dept: RADIOLOGY | Facility: MEDICAL CENTER | Age: 40
DRG: 165 | End: 2020-09-12
Attending: SURGERY
Payer: COMMERCIAL

## 2020-09-12 ENCOUNTER — ANESTHESIA EVENT (OUTPATIENT)
Dept: SURGERY | Facility: MEDICAL CENTER | Age: 40
DRG: 165 | End: 2020-09-12
Payer: COMMERCIAL

## 2020-09-12 ENCOUNTER — ANESTHESIA (OUTPATIENT)
Dept: SURGERY | Facility: MEDICAL CENTER | Age: 40
DRG: 165 | End: 2020-09-12
Payer: COMMERCIAL

## 2020-09-12 PROCEDURE — C1729 CATH, DRAINAGE: HCPCS | Performed by: SURGERY

## 2020-09-12 PROCEDURE — 0B5N4ZZ DESTRUCTION OF RIGHT PLEURA, PERCUTANEOUS ENDOSCOPIC APPROACH: ICD-10-PCS | Performed by: SURGERY

## 2020-09-12 PROCEDURE — 160029 HCHG SURGERY MINUTES - 1ST 30 MINS LEVEL 4: Performed by: SURGERY

## 2020-09-12 PROCEDURE — 770006 HCHG ROOM/CARE - MED/SURG/GYN SEMI*

## 2020-09-12 PROCEDURE — 160048 HCHG OR STATISTICAL LEVEL 1-5: Performed by: SURGERY

## 2020-09-12 PROCEDURE — 160041 HCHG SURGERY MINUTES - EA ADDL 1 MIN LEVEL 4: Performed by: SURGERY

## 2020-09-12 PROCEDURE — 501581 HCHG TROCAR: Performed by: SURGERY

## 2020-09-12 PROCEDURE — 700102 HCHG RX REV CODE 250 W/ 637 OVERRIDE(OP): Performed by: SURGERY

## 2020-09-12 PROCEDURE — 0BBC4ZZ EXCISION OF RIGHT UPPER LUNG LOBE, PERCUTANEOUS ENDOSCOPIC APPROACH: ICD-10-PCS | Performed by: SURGERY

## 2020-09-12 PROCEDURE — A9270 NON-COVERED ITEM OR SERVICE: HCPCS | Performed by: SURGERY

## 2020-09-12 PROCEDURE — 88307 TISSUE EXAM BY PATHOLOGIST: CPT

## 2020-09-12 PROCEDURE — 700111 HCHG RX REV CODE 636 W/ 250 OVERRIDE (IP): Performed by: SURGERY

## 2020-09-12 PROCEDURE — 700111 HCHG RX REV CODE 636 W/ 250 OVERRIDE (IP): Performed by: ANESTHESIOLOGY

## 2020-09-12 PROCEDURE — 700105 HCHG RX REV CODE 258: Performed by: ANESTHESIOLOGY

## 2020-09-12 PROCEDURE — 500800 HCHG LAPAROSCOPIC J/L HOOK: Performed by: SURGERY

## 2020-09-12 PROCEDURE — 501838 HCHG SUTURE GENERAL: Performed by: SURGERY

## 2020-09-12 PROCEDURE — 501570 HCHG TROCAR, SEPARATOR: Performed by: SURGERY

## 2020-09-12 PROCEDURE — 160035 HCHG PACU - 1ST 60 MINS PHASE I: Performed by: SURGERY

## 2020-09-12 PROCEDURE — 501463 HCHG STAPLES, UNIV. ROTIC: Performed by: SURGERY

## 2020-09-12 PROCEDURE — 700101 HCHG RX REV CODE 250: Performed by: ANESTHESIOLOGY

## 2020-09-12 PROCEDURE — 160036 HCHG PACU - EA ADDL 30 MINS PHASE I: Performed by: SURGERY

## 2020-09-12 PROCEDURE — 500512 HCHG ENDO PEANUT: Performed by: SURGERY

## 2020-09-12 PROCEDURE — 3E0L4GC INTRODUCTION OF OTHER THERAPEUTIC SUBSTANCE INTO PLEURAL CAVITY, PERCUTANEOUS ENDOSCOPIC APPROACH: ICD-10-PCS | Performed by: SURGERY

## 2020-09-12 PROCEDURE — 500445 HCHG HEMOSTAT, SURGICEL 4X8: Performed by: SURGERY

## 2020-09-12 PROCEDURE — A6402 STERILE GAUZE <= 16 SQ IN: HCPCS | Performed by: SURGERY

## 2020-09-12 PROCEDURE — 160009 HCHG ANES TIME/MIN: Performed by: SURGERY

## 2020-09-12 PROCEDURE — 71045 X-RAY EXAM CHEST 1 VIEW: CPT

## 2020-09-12 PROCEDURE — 700105 HCHG RX REV CODE 258: Performed by: SURGERY

## 2020-09-12 PROCEDURE — 700101 HCHG RX REV CODE 250: Performed by: SURGERY

## 2020-09-12 PROCEDURE — 160002 HCHG RECOVERY MINUTES (STAT): Performed by: SURGERY

## 2020-09-12 RX ORDER — OXYCODONE HCL 5 MG/5 ML
5 SOLUTION, ORAL ORAL
Status: DISCONTINUED | OUTPATIENT
Start: 2020-09-12 | End: 2020-09-12 | Stop reason: HOSPADM

## 2020-09-12 RX ORDER — MEPERIDINE HYDROCHLORIDE 25 MG/ML
12.5 INJECTION INTRAMUSCULAR; INTRAVENOUS; SUBCUTANEOUS
Status: DISCONTINUED | OUTPATIENT
Start: 2020-09-12 | End: 2020-09-12 | Stop reason: HOSPADM

## 2020-09-12 RX ORDER — HALOPERIDOL 5 MG/ML
1 INJECTION INTRAMUSCULAR
Status: DISCONTINUED | OUTPATIENT
Start: 2020-09-12 | End: 2020-09-12 | Stop reason: HOSPADM

## 2020-09-12 RX ORDER — OXYCODONE HCL 5 MG/5 ML
10 SOLUTION, ORAL ORAL
Status: DISCONTINUED | OUTPATIENT
Start: 2020-09-12 | End: 2020-09-12 | Stop reason: HOSPADM

## 2020-09-12 RX ORDER — DIPHENHYDRAMINE HYDROCHLORIDE 50 MG/ML
12.5 INJECTION INTRAMUSCULAR; INTRAVENOUS
Status: DISCONTINUED | OUTPATIENT
Start: 2020-09-12 | End: 2020-09-12 | Stop reason: HOSPADM

## 2020-09-12 RX ORDER — CEFAZOLIN SODIUM 1 G/3ML
INJECTION, POWDER, FOR SOLUTION INTRAMUSCULAR; INTRAVENOUS PRN
Status: DISCONTINUED | OUTPATIENT
Start: 2020-09-12 | End: 2020-09-12 | Stop reason: SURG

## 2020-09-12 RX ORDER — HYDROMORPHONE HYDROCHLORIDE 1 MG/ML
0.4 INJECTION, SOLUTION INTRAMUSCULAR; INTRAVENOUS; SUBCUTANEOUS
Status: DISCONTINUED | OUTPATIENT
Start: 2020-09-12 | End: 2020-09-12 | Stop reason: HOSPADM

## 2020-09-12 RX ORDER — ONDANSETRON 2 MG/ML
INJECTION INTRAMUSCULAR; INTRAVENOUS PRN
Status: DISCONTINUED | OUTPATIENT
Start: 2020-09-12 | End: 2020-09-12 | Stop reason: SURG

## 2020-09-12 RX ORDER — HYDROMORPHONE HYDROCHLORIDE 1 MG/ML
0.1 INJECTION, SOLUTION INTRAMUSCULAR; INTRAVENOUS; SUBCUTANEOUS
Status: DISCONTINUED | OUTPATIENT
Start: 2020-09-12 | End: 2020-09-12 | Stop reason: HOSPADM

## 2020-09-12 RX ORDER — BUPIVACAINE HYDROCHLORIDE AND EPINEPHRINE 5; 5 MG/ML; UG/ML
INJECTION, SOLUTION EPIDURAL; INTRACAUDAL; PERINEURAL
Status: DISCONTINUED | OUTPATIENT
Start: 2020-09-12 | End: 2020-09-12 | Stop reason: HOSPADM

## 2020-09-12 RX ORDER — SODIUM CHLORIDE, SODIUM LACTATE, POTASSIUM CHLORIDE, CALCIUM CHLORIDE 600; 310; 30; 20 MG/100ML; MG/100ML; MG/100ML; MG/100ML
INJECTION, SOLUTION INTRAVENOUS
Status: DISCONTINUED | OUTPATIENT
Start: 2020-09-12 | End: 2020-09-12 | Stop reason: SURG

## 2020-09-12 RX ORDER — SODIUM CHLORIDE, SODIUM LACTATE, POTASSIUM CHLORIDE, CALCIUM CHLORIDE 600; 310; 30; 20 MG/100ML; MG/100ML; MG/100ML; MG/100ML
INJECTION, SOLUTION INTRAVENOUS CONTINUOUS
Status: DISCONTINUED | OUTPATIENT
Start: 2020-09-12 | End: 2020-09-12 | Stop reason: HOSPADM

## 2020-09-12 RX ORDER — ROCURONIUM BROMIDE 10 MG/ML
INJECTION, SOLUTION INTRAVENOUS PRN
Status: DISCONTINUED | OUTPATIENT
Start: 2020-09-12 | End: 2020-09-12 | Stop reason: SURG

## 2020-09-12 RX ORDER — HYDROMORPHONE HYDROCHLORIDE 1 MG/ML
0.2 INJECTION, SOLUTION INTRAMUSCULAR; INTRAVENOUS; SUBCUTANEOUS
Status: DISCONTINUED | OUTPATIENT
Start: 2020-09-12 | End: 2020-09-12 | Stop reason: HOSPADM

## 2020-09-12 RX ORDER — DEXAMETHASONE SODIUM PHOSPHATE 4 MG/ML
INJECTION, SOLUTION INTRA-ARTICULAR; INTRALESIONAL; INTRAMUSCULAR; INTRAVENOUS; SOFT TISSUE PRN
Status: DISCONTINUED | OUTPATIENT
Start: 2020-09-12 | End: 2020-09-12 | Stop reason: SURG

## 2020-09-12 RX ORDER — ONDANSETRON 2 MG/ML
4 INJECTION INTRAMUSCULAR; INTRAVENOUS
Status: DISCONTINUED | OUTPATIENT
Start: 2020-09-12 | End: 2020-09-12 | Stop reason: HOSPADM

## 2020-09-12 RX ORDER — LIDOCAINE HYDROCHLORIDE 20 MG/ML
INJECTION, SOLUTION EPIDURAL; INFILTRATION; INTRACAUDAL; PERINEURAL PRN
Status: DISCONTINUED | OUTPATIENT
Start: 2020-09-12 | End: 2020-09-12 | Stop reason: SURG

## 2020-09-12 RX ORDER — MIDAZOLAM HYDROCHLORIDE 1 MG/ML
1 INJECTION INTRAMUSCULAR; INTRAVENOUS
Status: DISCONTINUED | OUTPATIENT
Start: 2020-09-12 | End: 2020-09-12 | Stop reason: HOSPADM

## 2020-09-12 RX ADMIN — ACETAMINOPHEN 1000 MG: 500 TABLET ORAL at 17:13

## 2020-09-12 RX ADMIN — PROPOFOL 200 MG: 10 INJECTION, EMULSION INTRAVENOUS at 07:46

## 2020-09-12 RX ADMIN — IBUPROFEN 800 MG: 800 TABLET, FILM COATED ORAL at 17:13

## 2020-09-12 RX ADMIN — SODIUM CHLORIDE, POTASSIUM CHLORIDE, SODIUM LACTATE AND CALCIUM CHLORIDE: 600; 310; 30; 20 INJECTION, SOLUTION INTRAVENOUS at 07:42

## 2020-09-12 RX ADMIN — EPHEDRINE SULFATE 10 MG: 50 INJECTION, SOLUTION INTRAVENOUS at 08:13

## 2020-09-12 RX ADMIN — ACETAMINOPHEN 1000 MG: 500 TABLET ORAL at 05:14

## 2020-09-12 RX ADMIN — FENTANYL CITRATE 100 MCG: 50 INJECTION INTRAMUSCULAR; INTRAVENOUS at 07:44

## 2020-09-12 RX ADMIN — DEXAMETHASONE SODIUM PHOSPHATE 8 MG: 4 INJECTION, SOLUTION INTRA-ARTICULAR; INTRALESIONAL; INTRAMUSCULAR; INTRAVENOUS; SOFT TISSUE at 07:56

## 2020-09-12 RX ADMIN — HEPARIN SODIUM 5000 UNITS: 5000 INJECTION, SOLUTION INTRAVENOUS; SUBCUTANEOUS at 21:20

## 2020-09-12 RX ADMIN — ACETAMINOPHEN 1000 MG: 500 TABLET ORAL at 23:06

## 2020-09-12 RX ADMIN — ACETAMINOPHEN 1000 MG: 500 TABLET ORAL at 00:04

## 2020-09-12 RX ADMIN — ACETAMINOPHEN 1000 MG: 500 TABLET ORAL at 11:45

## 2020-09-12 RX ADMIN — HEPARIN SODIUM 5000 UNITS: 5000 INJECTION, SOLUTION INTRAVENOUS; SUBCUTANEOUS at 14:56

## 2020-09-12 RX ADMIN — FENTANYL CITRATE 50 MCG: 50 INJECTION INTRAMUSCULAR; INTRAVENOUS at 09:22

## 2020-09-12 RX ADMIN — IBUPROFEN 800 MG: 800 TABLET, FILM COATED ORAL at 05:14

## 2020-09-12 RX ADMIN — ROCURONIUM BROMIDE 50 MG: 10 INJECTION, SOLUTION INTRAVENOUS at 07:46

## 2020-09-12 RX ADMIN — LIDOCAINE HYDROCHLORIDE 60 MG: 20 INJECTION, SOLUTION EPIDURAL; INFILTRATION; INTRACAUDAL at 07:46

## 2020-09-12 RX ADMIN — SODIUM CHLORIDE: 9 INJECTION, SOLUTION INTRAVENOUS at 00:56

## 2020-09-12 RX ADMIN — SODIUM CHLORIDE: 9 INJECTION, SOLUTION INTRAVENOUS at 10:50

## 2020-09-12 RX ADMIN — CEFAZOLIN 2 G: 330 INJECTION, POWDER, FOR SOLUTION INTRAMUSCULAR; INTRAVENOUS at 07:49

## 2020-09-12 RX ADMIN — ONDANSETRON 4 MG: 2 INJECTION INTRAMUSCULAR; INTRAVENOUS at 08:21

## 2020-09-12 RX ADMIN — IBUPROFEN 800 MG: 800 TABLET, FILM COATED ORAL at 11:45

## 2020-09-12 RX ADMIN — SODIUM CHLORIDE, POTASSIUM CHLORIDE, SODIUM LACTATE AND CALCIUM CHLORIDE: 600; 310; 30; 20 INJECTION, SOLUTION INTRAVENOUS at 09:27

## 2020-09-12 ASSESSMENT — COGNITIVE AND FUNCTIONAL STATUS - GENERAL
MOBILITY SCORE: 24
DAILY ACTIVITIY SCORE: 24
SUGGESTED CMS G CODE MODIFIER MOBILITY: CH
SUGGESTED CMS G CODE MODIFIER DAILY ACTIVITY: CH

## 2020-09-12 ASSESSMENT — PAIN DESCRIPTION - PAIN TYPE
TYPE: SURGICAL PAIN

## 2020-09-12 ASSESSMENT — COPD QUESTIONNAIRES
DO YOU EVER COUGH UP ANY MUCUS OR PHLEGM?: NO/ONLY WITH OCCASIONAL COLDS OR INFECTIONS
HAVE YOU SMOKED AT LEAST 100 CIGARETTES IN YOUR ENTIRE LIFE: NO/DON'T KNOW
DURING THE PAST 4 WEEKS HOW MUCH DID YOU FEEL SHORT OF BREATH: NONE/LITTLE OF THE TIME
COPD SCREENING SCORE: 0

## 2020-09-12 ASSESSMENT — PAIN SCALES - GENERAL: PAIN_LEVEL: 2

## 2020-09-12 NOTE — ANESTHESIA TIME REPORT
Anesthesia Start and Stop Event Times     Date Time Event    9/12/2020 0731 Ready for Procedure     0742 Anesthesia Start     0851 Anesthesia Stop        Responsible Staff  09/12/20    Name Role Begin End    Brandyn Sanchez M.D. Anesth 0742 0851        Preop Diagnosis (Free Text):  Pre-op Diagnosis     Spontaneous pneumothorax, right        Preop Diagnosis (Codes):    Post op Diagnosis  Pneumothorax      Premium Reason  E. Weekend    Comments:

## 2020-09-12 NOTE — ED TRIAGE NOTES
"Lyndon Sigala  40 y.o. male  Chief Complaint   Patient presents with   • Sent from Urgent Care     Pt reports right sided chest pain, SOB starting 1 week ago. Xray at urgent care shows \"very large right sided Pnemothorax.\" Pt denies trauma. Denies hx of such   • Chest Pain   • Shortness of Breath     Pt BIB EMS for above complaint.      Pt appears anxious, but comfortable. O2 sats maintaining above 94% on RA.     Pt had negative COVID test yesterday.     Blood Pressure: 153/94, Pulse: 80, Respiration: 18, Temperature: 36.3 °C (97.4 °F), Height: 182.9 cm (6'), Weight: 72.6 kg (160 lb), BMI (Calculated): 21.7, BSA (Calculated): 1.9, Pulse Oximetry: 95 %, O2 (LPM): 0, O2 Delivery Device: None - Room Air    "

## 2020-09-12 NOTE — PROCEDURES
PreOp Diagnosis: Spontaneous pneumothorax, right     PostOp Diagnosis: same     Procedure(s): Bedside tube thoracostomy (percutaneous 10 [place] pigtail catheter right chest)     Surgeon(s):  Brian Desai M.D.    Indications: Spontaneous pneumothorax.  The procedure was discussed with him in detail including the risk, benefits, alternatives, and he wished to proceed.      Procedure details:  Informed consent was obtained. The right chest was prepped with chlorhexidine and sterile drapes applied.  Posterior rib blocks were performed and an incision was made just below the inframammary crease in the lateral chest.    A 10 [place] pigtail drain was placed using the provided trocar and this was advanced over the trocar after entrance into the pleural space without difficulty.  The tube was then connected via a Luer-Braeden connector to a Pleur-evac which was placed on -20 suction.  A large amount of air was evacuated.  No fluid was evacuated.  A sterile dressing was applied and covered with tape.  This tube was placed to pleurevac drainage on -20 mmHg.  The patient tolerated the procedure well, and without complication.     Brian Desai MD  [facility]

## 2020-09-12 NOTE — ANESTHESIA PROCEDURE NOTES
Airway    Date/Time: 9/12/2020 7:47 AM  Performed by: Brandyn Sanchez M.D.  Authorized by: Brandyn Sanchez M.D.     Location:  OR  Urgency:  Elective  Difficult Airway: No    Indications for Airway Management:  Anesthesia      Spontaneous Ventilation: absent    Sedation Level:  Deep  Preoxygenated: Yes    Patient Position:  Sniffing  Final Airway Type:  Endotracheal airway  Final Endotracheal Airway:  ETT and ETT - double lumen left with ONE LUNG VENTILATION  Cuffed: Yes    Technique Used for Successful ETT Placement:  Direct laryngoscopy    Insertion Site:  Oral  Blade Type:  Lane  Laryngoscope Blade/Videolaryngoscope Blade Size:  3  ETT Size (mm):  2.0  ETT Double Lumen (fr):  41  Measured from:  Lips  ETT to Lips (cm):  26  Placement Verified by: auscultation and capnometry    Cormack-Lehane Classification:  Grade IIa - partial view of glottis  Number of Attempts at Approach:  1  Number of Other Approaches Attempted:  0

## 2020-09-12 NOTE — OP REPORT
Operative Report    Date: 9/12/2020    PreOp Diagnosis:   1.  Spontaneous pneumothorax, right     PostOp Diagnosis: Same     Procedure(s):  1.  Right THORACOSCOPY, wedge resection, upper lobe, mechanical and CHEMICAL PLEUREDESIS - Wound Class: Clean Contaminated     Surgeon(s):  Brian Desai M.D.     Anesthesiologist/Type of Anesthesia:  Anesthesiologist: Brandyn Sanchez M.D./General     Surgical Staff:  Circulator: Lukas D. Gansert, R.N.  Scrub Person: Anita A Reyes, R.N.; Sabino Lo     Specimens removed if any:  ID Type Source Tests Collected by Time Destination   A : RIGHT UPPER LOBE WEDGE Tissue Lung PATHOLOGY SPECIMEN Brian Desai M.D. 9/12/2020  8:16 AM           Estimated Blood Loss: 25 mL     Findings: Small apical adhesion, tiny apical blebs/imperfections in the lung surface     Complications: None noted    Indications:  40-year-old male presenting with a large right spontaneous pneumothorax.  The patient is concerned that he has had similar symptoms in the past, but did not seek medical attention at that time.  Thoracoscopic pleurodesis was recommended to him and discussed with him in detail including the risks, benefits, and alternatives, and he wished to proceed.    Procedure in detail:   Patient was taken the operating room placed in the operative table in supine position.  General endotracheal anesthesia was induced.  Appropriate monitoring lines were placed.  Patient was then placed in left lateral decubitus position exposing the right chest.  Care was taken to pad all pressure points.  The existing right pleural pigtail was removed and discarded.  A sterile prep and drape of the right chest was performed.  A timeout was performed.    Following injection of local anesthetic, a 5 mm incision was made in the posterior axillary line approximately eighth intercostal space.  A 30 degree thoracoscope was introduced in the right pleural space examined.  There was no pleural-based lesions or  significant effusion, but there was a small amount of serous fluid.  There was an adhesion of at the apex.    2 more thoracoscopic ports were introduced.  A 12 mm incision in the anterior axillary line approximately the sixth intercostal space, and a 5 mm and port posteriorly for help with retraction.  Electrocautery was used to carefully divide the apical adhesion.  Hemostasis was ensured.  The lung surface at the right lung apex was noted to have several small imperfections with the appearance of small subpleural blebs.  This area was then divided in the fashion of a wedge resection using serial firings of a blue load 45 mm Endo OPHELIA stapler.  This was passed off as permanent specimen.  A mechanical pleurodesis was then performed using a Bovie scratch pad on a long instrument.  Hemostasis was ensured.  400 mg of doxycycline solution was then introduced into the right pleural space.  A 28 Malaysian chest tube was placed in apicoposterior position and sutured in the standard fashion with 0 silk.  The lung was then allowed to reinflate under direct vision.  The chest tube was kept on waterseal.  The remaining incisions were closed in layers with 2-0 and 4-0 Vicryl, and appropriate dressings applied.  This concluded the procedure.  The patient was then allowed to emerge from general anesthesia was extubated taken the PACU in stable condition.    Sponge and needle counts were correct at the end of the case.       Brian Desai M.D.  [facility]  [phone]

## 2020-09-12 NOTE — ED PROVIDER NOTES
"ED Provider Note    Scribed for Dr. Adonay Jackson M.D. by Marcela Arenas. 9/11/2020  6:52 PM    Primary care provider:  None noted  Means of arrival: EMS  History obtained from: Patient  History limited by: None    CHIEF COMPLAINT  Chief Complaint   Patient presents with   • Sent from Urgent Care     Pt reports right sided chest pain, SOB starting 1 week ago. Xray at urgent care shows \"very large right sided Pnemothorax.\" Pt denies trauma. Denies hx of such   • Chest Pain   • Shortness of Breath       HPI  Lyndon Sigala is a 40 y.o. male who presents to the Emergency Department for intermittent episodes of constant, moderate shortness of breath that began one week ago and has not resolved since onset. The patient reports that over the past week he has been experiencing constant fatigue and intermittent shortness of breath that is exacerbated upon exertion such as walking up the stairs or lifting boxes. He additionally notes that he had a mild dry cough two days ago that has now resolved with duration. The patient states that he had a COVID-19 test performed which was negative. Today, the patient developed sudden intermittent episodes of right-sided chest discomfort which prompted him to visit Urgent Care. At Urgent Care, the patient had a chest x-ray performed that showed a large right-sided pneumothorax and was advised to visit the ED for further evaluation of his condition and likely admission. The patient denies any recent trauma or symptoms such as fevers or chills. He denies a history of any previous issues with shortness of breath or other respiratory issues such as asthma or COPD. The patient does not report taking any over the counter medications for their current symptoms. He has no known allergies to medications.    REVIEW OF SYSTEMS  Pertinent positives include intermittent shortness of breath, fatigue, resolved dry cough, right-sided chest pain. Pertinent negatives include no trauma, fevers, chills. " As above, all other systems reviewed and are negative.   See HPI for further details.     PAST MEDICAL HISTORY  No major past medical history was reported.    SURGICAL HISTORY  patient denies any surgical history    SOCIAL HISTORY  Social History     Tobacco Use   • Smoking status: Former Smoker     Packs/day: 0.00     Types: Cigarettes     Quit date: 2012     Years since quittin.9   • Smokeless tobacco: Never Used   Substance Use Topics   • Alcohol use: Yes     Comment: occ   • Drug use: Yes     Types: Inhaled     Comment: marijuana      Social History     Substance and Sexual Activity   Drug Use Yes   • Types: Inhaled    Comment: marijuana       FAMILY HISTORY  History reviewed. No pertinent family history.    CURRENT MEDICATIONS  Home Medications     Reviewed by Juan Singh R.N. (Registered Nurse) on 20 at 1847  Med List Status: Partial   Medication Last Dose Status   ibuprofen (MOTRIN) 200 MG TABS  Active   methylPREDNISolone (MEDROL DOSEPAK) 4 MG Tablet Therapy Pack  Active                ALLERGIES  No Known Allergies    PHYSICAL EXAM  VITAL SIGNS: /94   Pulse 80   Temp 36.3 °C (97.4 °F) (Temporal)   Resp 18   Ht 1.829 m (6')   Wt 72.6 kg (160 lb)   SpO2 95%   BMI 21.70 kg/m²     Constitutional: Well developed, Well nourished, No distress, Non-toxic appearance.   HENT: Normocephalic, Atraumatic, Bilateral external ears normal.  Cardiovascular: Normal heart rate, Normal rhythm.   Thorax & Lungs: Unable to auscultate breath sounds on the right side of the chest.  Skin: Warm, Dry, No erythema, No rash.   Neurologic: Awake, alert. Moves all extremities spontaneously.  Psychiatric: Affect normal, Judgment normal, Mood normal.     LABS  Results for orders placed or performed during the hospital encounter of 20   CBC WITH DIFFERENTIAL   Result Value Ref Range    WBC 6.8 4.8 - 10.8 K/uL    RBC 5.43 4.70 - 6.10 M/uL    Hemoglobin 17.1 14.0 - 18.0 g/dL    Hematocrit 48.6 42.0 -  52.0 %    MCV 89.5 81.4 - 97.8 fL    MCH 31.5 27.0 - 33.0 pg    MCHC 35.2 33.7 - 35.3 g/dL    RDW 39.5 35.9 - 50.0 fL    Platelet Count 268 164 - 446 K/uL    MPV 10.1 9.0 - 12.9 fL    Neutrophils-Polys 57.50 44.00 - 72.00 %    Lymphocytes 32.90 22.00 - 41.00 %    Monocytes 6.10 0.00 - 13.40 %    Eosinophils 2.50 0.00 - 6.90 %    Basophils 0.70 0.00 - 1.80 %    Immature Granulocytes 0.30 0.00 - 0.90 %    Nucleated RBC 0.00 /100 WBC    Neutrophils (Absolute) 3.89 1.82 - 7.42 K/uL    Lymphs (Absolute) 2.23 1.00 - 4.80 K/uL    Monos (Absolute) 0.41 0.00 - 0.85 K/uL    Eos (Absolute) 0.17 0.00 - 0.51 K/uL    Baso (Absolute) 0.05 0.00 - 0.12 K/uL    Immature Granulocytes (abs) 0.02 0.00 - 0.11 K/uL    NRBC (Absolute) 0.00 K/uL   COMP METABOLIC PANEL   Result Value Ref Range    Sodium 141 135 - 145 mmol/L    Potassium 4.2 3.6 - 5.5 mmol/L    Chloride 103 96 - 112 mmol/L    Co2 23 20 - 33 mmol/L    Anion Gap 15.0 7.0 - 16.0    Glucose 106 (H) 65 - 99 mg/dL    Bun 17 8 - 22 mg/dL    Creatinine 1.18 0.50 - 1.40 mg/dL    Calcium 10.2 8.5 - 10.5 mg/dL    AST(SGOT) 13 12 - 45 U/L    ALT(SGPT) 13 2 - 50 U/L    Alkaline Phosphatase 57 30 - 99 U/L    Total Bilirubin 0.8 0.1 - 1.5 mg/dL    Albumin 5.1 (H) 3.2 - 4.9 g/dL    Total Protein 8.0 6.0 - 8.2 g/dL    Globulin 2.9 1.9 - 3.5 g/dL    A-G Ratio 1.8 g/dL   APTT   Result Value Ref Range    APTT 26.3 24.7 - 36.0 sec   PROTHROMBIN TIME (INR)   Result Value Ref Range    PT 13.2 12.0 - 14.6 sec    INR 0.97 0.87 - 1.13   ESTIMATED GFR   Result Value Ref Range    GFR If African American >60 >60 mL/min/1.73 m 2    GFR If Non African American >60 >60 mL/min/1.73 m 2   COVID/SARS CoV-2 PCR    Specimen: Nasopharyngeal; Respirate   Result Value Ref Range    COVID Order Status Received    SARS-CoV-2, PCR (In-House)   Result Value Ref Range    SARS-CoV-2 Source NP Swab    EKG   Result Value Ref Range    Report       [facility] Emergency Dept[facility]    Test Date:  2020-09-11  Pt  Name:    SUSHIL WRAY                 Department: ER  MRN:        [id number]                      Room:       [facility]  Gender:     Male                         Technician: [id number]  :        1980                   Requested By:ER TRIAGE PROTOCOL  Order #:    [id number]                    Reading MD:    Measurements  Intervals                                Axis  Rate:       93                           P:          96  MD:         136                          QRS:        94  QRSD:       106                          T:          -6  QT:         344  QTc:        428    Interpretive Statements  SINUS RHYTHM  PROBABLE LEFT ATRIAL ABNORMALITY  INCOMPLETE RIGHT BUNDLE BRANCH BLOCK  No previous ECG available for comparison         All labs reviewed by me     *    EKG  Interpreted by me as shown above.     RADIOLOGY  CT-CHEST (THORAX) W/O    (Results Pending)     The radiologist's interpretation of all radiological studies have been reviewed by me.    COURSE & MEDICAL DECISION MAKING  Pertinent Labs & Imaging studies reviewed. (See chart for details)    6:52 PM - Patient seen and examined at bedside. Patient presents to the ED complaining of intermittent shortness of breath for the past week and right-sided chest paint that began earlier today. He visited Urgent Care where he had a chest x-ray performed that showed a large right-sided pneumothorax and was instructed to visit the ED. Discussed plan of care with patient which includes obtaining lab work for further evaluation of his condition. He was informed that chest tube will likely be placed in his chest in order to alleviate his pneumothorax. Patient is agreeable to the plan of care. Ordered EKG, INR, APTT, CMP and CBC with differential to evaluate his symptoms.     7:01 PM Given the patient's findings and current presentation, general surgery will be paged. At this time, Dr. Desai (General Surgery) was paged.     7:18 PM I discussed the patient's case and the above  findings with Dr. Desai (General Surgery) who has reviewed the patient's case and his radiology results. Dr. Desai suggests inserting a chest tube into the right side of the patient's chest to alleviate his pneumothorax.     7:24 PM Given the patient's lab results and current presentation, general surgery will need to be paged again to discuss how the chest tube will be placed. At this time, Dr. Desai (General Surgery) was paged    7:28 PM I discussed the patient's case and the above findings with Dr. Desai (General Surgery) who advises to also consult with Interventional Radiology to determine if Dr. Prince can place the chest tube into the patient's chest.     7:42 PM At this time, Dr. Prince (IR) and Dr. Desai (General surgery) were paged    7:43 PM I discussed the patient's case and the above findings with Dr. Prince (Interventional Radiology) who has reviewed the patient's case. She states that she is unable to evaluate the patient at bedside to insert the chest tube at this time. She advises to consult with General Surgery again to have Dr. Desai place the chest tube in the patient's chest.     7:47 PM I discussed the patient's case and the above findings with Dr. Desai (General Surgery) who agrees to evaluate the patient at bedside and insert the chest tube for his pneumothorax.     7:50 PM Recheck. I updated the patient on the plan of care. He was informed that Dr. Desai will evaluate him at bedside and place a chest tube into the right side of his chest. Patient verbalizes his understanding and agreement to the plan of care.     8:23 PM The consent form was signed for the chest tube placement. Currently, the patient is awaiting for Dr. Desai to evaluate the patient at bedside and place the chest tube.     8:25 PM Ordered COVID/SARS CoV-2 PCR for further evaluation of his symptoms    8:32 PM The patient was able to ambulate to the bathroom with steady gait. He denies shortness of breath    8:49 PM  At this time, Dr. Desai (General Surgery) is present at bedside for chest tube placement. Patient was medicated with Morphine 4 mg and Ativan 0.5 mg for pain control before the chest tube is placed.     9:20 PM Ordered SARS-CoV-2 PCR for further evaluation of his symptoms    9:34 PM At this time, the chest tube was placed and a post procedure CT chest without contrast was ordered by Dr. Desai to look for subpleural blebs. Dr. Desai recommended that the patient proceeds with a semi-elective right thoracoscopy, blebectomy if present and chemical pleurodesis. The patient wished to proceed with surgery given low risk and fairly high likelihood second episode.  The procedure was discussed in detail including the risks, benefits, alternatives. The operation was discussed along with the risks. The patient will be admitted to surgery and was instructed to remain NPO after midnight. Patient is agreeable to the plan of care. He states that is experiencing some discomfort. Discussed plan of care with patient which includes treating the patient with Morphine. Patient is agreeable to the plan of care. Patient was medicated with Morphine 4 mg for pain control.       Decision Making:  Patient with spontaneous pneumothorax, treated in the emergency room with right chest tube placed by Dr. Desai in the emergency room.  He is can be admitted for further work-up including CT scan to assess for labs and need for other intervention.    DISPOSITION:  Patient will be hospitalized by Dr. Desai (General Surgery) in guarded condition.      FINAL IMPRESSION  1. Spontaneous pneumothorax          Marcela SPRINGER (Johanne), am scribing for, and in the presence of, Adonay Jackson M.D..    Electronically signed by: Marcela Gutierrez), 9/11/2020    Adonay SPRINGER M.D. personally performed the services described in this documentation, as scribed by Marcela Arenas in my presence, and it is both accurate and complete.    C    The note  accurately reflects work and decisions made by me.  Adonay Jackson M.D.  9/11/2020  10:23 PM

## 2020-09-12 NOTE — H&P
Surgical Consultation    Date: 9/11/2020    Requesting Physician: Dr. Jackson  PCP: Pcp Pt States None  Attending Physician: Brian Desai M.D.    CC: Shortness of breath, pleuritic chest pain    HPI: This is a 40 y.o. male who is presenting with several days of intermittent chest discomfort and increasing shortness of breath.  He underwent outpatient chest x-ray showing a large right-sided pneumothorax without tension physiology.  He presented to the emergency department where he had normal vital signs.  Surgery was called.  No fever, chills, nausea, vomiting, hematemesis, hemoptysis, hematochezia, melena, neurologic change.  It is unclear whether this patient has had an episode of pneumothorax in the past based on subjective symptoms, but he has not had a documented pneumothorax in the past.    History reviewed. No pertinent past medical history.    History reviewed. No pertinent surgical history.    Current Facility-Administered Medications   Medication Dose Route Frequency Provider Last Rate Last Dose   • morphine (pf) 4 MG/ML injection 4 mg  4 mg Intravenous Once Adonay Jackson M.D.       • Respiratory Therapy Consult   Nebulization Continuous RT Brian Desai M.D.       • Pharmacy Consult Request ...Pain Management Review 1 Each  1 Each Other PHARMACY TO DOSE Brian Desai M.D.       • [START ON 9/12/2020] docusate sodium (COLACE) capsule 100 mg  100 mg Oral BID Brian Desai M.D.       • [START ON 9/12/2020] senna-docusate (PERICOLACE or SENOKOT S) 8.6-50 MG per tablet 1 Tab  1 Tab Oral Nightly Brian Desai M.D.       • senna-docusate (PERICOLACE or SENOKOT S) 8.6-50 MG per tablet 1 Tab  1 Tab Oral Q24HRS PRN Brian Desai M.D.       • [START ON 9/12/2020] polyethylene glycol/lytes (MIRALAX) PACKET 1 Packet  1 Packet Oral BID Brian Desai M.D.       • [START ON 9/12/2020] magnesium hydroxide (MILK OF MAGNESIA) suspension 30 mL  30 mL Oral DAILY Brian Desai M.D.       • bisacodyl (DULCOLAX)  suppository 10 mg  10 mg Rectal Q24HRS PRN Brian Desai M.D.       • fleet enema 133 mL  1 Each Rectal Once PRN Brian Desai M.D.       • NS infusion   Intravenous Continuous Brian Desai M.D.       • [START ON 2020] heparin injection 5,000 Units  5,000 Units Subcutaneous Q8HRS Brian Deasi M.D.       • [START ON 2020] acetaminophen (TYLENOL) tablet 1,000 mg  1,000 mg Oral Q6HRS Brian Desai M.D.       • [START ON 2020] ibuprofen (MOTRIN) tablet 800 mg  800 mg Oral TID Brian Desai M.D.       • oxyCODONE immediate-release (ROXICODONE) tablet 5 mg  5 mg Oral Q3HRS PRN Brian Desai M.D.       • morphine (pf) 4 MG/ML injection 2 mg  2 mg Intravenous Q3HRS PRN Brian Desai M.D.       • ondansetron (ZOFRAN) syringe/vial injection 4 mg  4 mg Intravenous Q4HRS PRN Brian Desai M.D.         Current Outpatient Medications   Medication Sig Dispense Refill   • methylPREDNISolone (MEDROL DOSEPAK) 4 MG Tablet Therapy Pack Follow schedule on package instructions. 21 Tab 0   • ibuprofen (MOTRIN) 200 MG TABS Take 600 mg by mouth every 6 hours as needed.         Social History     Socioeconomic History   • Marital status: Single     Spouse name: Not on file   • Number of children: Not on file   • Years of education: Not on file   • Highest education level: Not on file   Occupational History   • Not on file   Social Needs   • Financial resource strain: Not on file   • Food insecurity     Worry: Not on file     Inability: Not on file   • Transportation needs     Medical: Not on file     Non-medical: Not on file   Tobacco Use   • Smoking status: Former Smoker     Packs/day: 0.00     Types: Cigarettes     Quit date: 2012     Years since quittin.9   • Smokeless tobacco: Never Used   Substance and Sexual Activity   • Alcohol use: Yes     Comment: occ   • Drug use: Yes     Types: Inhaled     Comment: marijuana   • Sexual activity: Not on file   Lifestyle   • Physical activity     Days per  week: Not on file     Minutes per session: Not on file   • Stress: Not on file   Relationships   • Social connections     Talks on phone: Not on file     Gets together: Not on file     Attends [employer] service: Not on file     Active member of club or organization: Not on file     Attends meetings of clubs or organizations: Not on file     Relationship status: Not on file   • Intimate partner violence     Fear of current or ex partner: Not on file     Emotionally abused: Not on file     Physically abused: Not on file     Forced sexual activity: Not on file   Other Topics Concern   • Not on file   Social History Narrative   • Not on file       History reviewed. No pertinent family history.    Allergies:  Patient has no known allergies.    Review of Systems:  Negative except as noted above in the HPI on 10 point review    Physical Exam:  /94   Pulse 80   Temp 36.3 °C (97.4 °F) (Temporal)   Resp 18   Ht 1.829 m (6')   Wt 72.6 kg (160 lb)   SpO2 95%     Constitutional: he is oriented to person, place, and time.  he appears well-developed and well-nourished. No acute distress.   Head: Normocephalic and atraumatic.   Neck: Normal range of motion. Neck supple. No JVD present. No tracheal deviation present.   Cardiovascular: Normal rate, regular rhythm  Pulmonary/Chest: Breathing is nonlabored on low flow nasal cannula.  No stridor, no respiratory distress  Abdominal: Soft, nontender, nondistended.   Musculoskeletal: Normal range of motion. he exhibits no edema and no tenderness.   Neurological: he is alert and oriented to person, place, and time.  No gross motor or sensory deficit  Skin: Skin is warm and dry. No rash noted. he is not diaphoretic. No erythema. No pallor.   Psychiatric: he has a normal mood and affect.  Behavior is normal.       Labs:  Recent Labs     09/11/20  1855   WBC 6.8   RBC 5.43   HEMOGLOBIN 17.1   HEMATOCRIT 48.6   MCV 89.5   MCH 31.5   MCHC 35.2   RDW 39.5   PLATELETCT 268   MPV 10.1      Recent Labs     09/11/20 1855   SODIUM 141   POTASSIUM 4.2   CHLORIDE 103   CO2 23   GLUCOSE 106*   BUN 17   CREATININE 1.18   CALCIUM 10.2     Recent Labs     09/11/20 1855   APTT 26.3   INR 0.97     Recent Labs     09/11/20 1855   ASTSGOT 13   ALTSGPT 13   TBILIRUBIN 0.8   ALKPHOSPHAT 57   GLOBULIN 2.9   INR 0.97       Radiology:  CT-CHEST (THORAX) W/O    (Results Pending)       Assessment: This is a 40 y.o. male with an episode of right-sided spontaneous pneumothorax status post bedside tube thoracostomy with a 10 Filipino pigtail.  Hemodynamically stable.  CT chest pending to look for blebs.      Plan:   -Recommend proceeding with pigtail placement (performed already) for evacuation of pneumothorax  -Post procedure CT chest without contrast to look for subpleural blebs  -Recommend proceeding with semi-elective right thoracoscopy, blebectomy if present, and chemical pleurodesis.  The pros and cons of proceeding with surgery versus waiting for second episode were discussed.  The patient wished to proceed with surgery given low risk and fairly high likelihood of second episode.  The procedure was discussed in detail including the risks, benefits, alternatives. The operation was discussed along with the risks, which include but are not limited to bleeding, infection, damage to surrounding structures, need for further procedures, prolonged air-leak, recurrent pneumothorax pneumonia, death.  The benefits and alternatives were also discussed and the patient wishes to proceed.  -Admit to surgery  -N.p.o. after midnight  -Chest tube to suction      Thank you very much for this consultation.    Brian Desai M.D.  [facility]  [phone]

## 2020-09-12 NOTE — PROGRESS NOTES
Subjective:     Lyndon Sigala is a 40 y.o. male who presents for Shortness of Breath (x 1 wk,shortness of breath and little cough.  Tested negative for Covid yesterday)    HPI  Pt presents for evaluation of shortness of breath   Pt feeling short of breath and easily fatigued for the past 1 week   Patient feels his exercise tolerance is low  Has a slight cough   No sore throat, rhinorrhea, headache, ear pain   Pt did get tested for COVID-19 and was negative   Patient had acute right-sided shoulder pain about a week ago and has continued right shoulder aching which is mild    Review of Systems   Constitutional: Positive for malaise/fatigue. Negative for fever.   HENT: Negative for sore throat.    Respiratory: Positive for cough. Negative for shortness of breath.    Cardiovascular: Negative for chest pain.   Gastrointestinal: Negative for vomiting.   Skin: Negative for rash.     PMH: No chronic medical problems   MEDS: No daily meds  ALLERGIES: No Known Allergies  SURGHX: None  SOCHX:  reports that he quit smoking about 7 years ago. His smoking use included cigarettes. He smoked 0.00 packs per day. He has never used smokeless tobacco.  FH: Family history was reviewed, unsure of his family history, however does not think there is any history of colon cancer     Objective:   /78 (BP Location: Left arm, Patient Position: Sitting, BP Cuff Size: Adult)   Pulse 92   Temp 36.3 °C (97.3 °F) (Temporal)   Resp 16   Ht 1.829 m (6')   Wt 57.2 kg (126 lb)   SpO2 93%   BMI 17.09 kg/m²     Physical Exam  Constitutional:       General: He is not in acute distress.     Appearance: He is well-developed. He is not diaphoretic.   HENT:      Head: Normocephalic and atraumatic.      Right Ear: Tympanic membrane, ear canal and external ear normal.      Left Ear: Tympanic membrane, ear canal and external ear normal.      Nose: Nose normal.      Mouth/Throat:      Mouth: Mucous membranes are moist.      Pharynx: Oropharynx  is clear. No oropharyngeal exudate or posterior oropharyngeal erythema.   Eyes:      General: No scleral icterus.        Right eye: No discharge.         Left eye: No discharge.      Conjunctiva/sclera: Conjunctivae normal.      Pupils: Pupils are equal, round, and reactive to light.   Neck:      Musculoskeletal: Normal range of motion.      Trachea: No tracheal deviation.   Cardiovascular:      Rate and Rhythm: Normal rate and regular rhythm.      Heart sounds: Normal heart sounds.   Pulmonary:      Effort: Pulmonary effort is normal. No respiratory distress.      Breath sounds: Normal breath sounds. No wheezing or rales.   Musculoskeletal: Normal range of motion.   Skin:     General: Skin is warm and dry.      Findings: No rash.   Neurological:      Mental Status: He is alert and oriented to person, place, and time.   Psychiatric:         Behavior: Behavior normal.         Thought Content: Thought content normal.         Judgment: Judgment normal.       Assessment/Plan:   Assessment    1. Shortness of breath  - DX-CHEST-2 VIEWS; Future    Patient with new onset shortness of breath/fatigue.  Has normal physical exam.  Chest x-ray shows large pneumothorax.  Despite very large pneumothorax, patient clinically looks comfortable, no splinting, no cough, able to take deep breaths, and oxygen saturation over 93%.  Patient was sent to [facility] via ambulance for further management and likely admission.

## 2020-09-12 NOTE — PROGRESS NOTES
Bedside report received.  Assessment complete.  A&O x 4. Patient calls appropriately.  Patient ambulates with standby assist. Bed alarm off.   Patient has 2/10 pain. Pain managed with prescribed medications.  Denies N&V. Tolerating full liquid diet.  Surgical dressing and chest tube CDI.  + void, + flatus, last BM PTA.  Patient denies SOB.  SCD's on.  Patient is pleasant and cooperative.  Review plan with of care with patient. Call light and personal belongings with in reach. Hourly rounding in place. All needs met at this time.  /82   Pulse 73   Temp 36.3 °C (97.3 °F) (Temporal)   Resp 18   Ht 1.829 m (6')   Wt 71 kg (156 lb 8.4 oz)   SpO2 98%   BMI 21.23 kg/m²

## 2020-09-12 NOTE — ANESTHESIA POSTPROCEDURE EVALUATION
Patient: Lyndon Sigala    Procedure Summary     Date: 09/12/20 Room / Location: [place] [address] / SURGERY [facility]    Anesthesia Start: 0742 Anesthesia Stop: 0851    Procedure: THORACOSCOPY, BLEBECTOMY, CHEMICAL PLEUREDESIS (Right Chest) Diagnosis: (Spontaneous pneumothorax, right)    Surgeon: Brian Desai M.D. Responsible Provider: Brandyn Sanchez M.D.    Anesthesia Type: general ASA Status: 2          Final Anesthesia Type: general  Last vitals  BP   Blood Pressure: 133/77    Temp   36.2 °C (97.2 °F)    Pulse   Pulse: 76   Resp   (!) 21    SpO2   96 %      Anesthesia Post Evaluation    Patient location during evaluation: PACU  Patient participation: complete - patient participated  Level of consciousness: awake and alert  Pain score: 2    Airway patency: patent  Anesthetic complications: no  Cardiovascular status: hemodynamically stable  Respiratory status: acceptable  Hydration status: euvolemic    PONV: none           Nurse Pain Score: 6 (NPRS)

## 2020-09-12 NOTE — PROGRESS NOTES
2 RN Skin Check    R chest tube in place.  All other skin warm, dry, intact.  Skin over bony prominences shows no signs of breakdown.

## 2020-09-12 NOTE — ANESTHESIA QCDR
2019 [facility] Clinical Data Registry (for Quality Improvement)     Postoperative nausea/vomiting risk protocol (Adult = 18 yrs and Pediatric 3-17 yrs)- (430 and 463)  General inhalation anesthetic (NOT TIVA) with PONV risk factors: Yes  Provision of anti-emetic therapy with at least 2 different classes of agents: Yes   Patient DID NOT receive anti-emetic therapy and reason is documented in Medical Record:  N/A    Multimodal Pain Management- (477)  Non-emergent surgery AND patient age >= 18: No  Use of Multimodal Pain Management, two or more drugs and/or interventions, NOT including systemic opioids:   Exception: Documented allergy to multiple classes of analgesics:     Smoking Abstinence (404)  Patient is current smoker (cigarette, pipe, e-cig, marijuanna): No  Elective Surgery:   Abstinence instructions provided prior to day of surgery:   Patient abstained from smoking on day of surgery:     Pre-Op Beta-Blocker in Isolated CABG (44)  Isolated CABG AND patient age >= 18: No  Beta-blocker admin within 24 hours of surgical incision:   Exception:of medical reason(s) for not administering beta blocker within 24 hours prior to surgical incision (e.g., not  indicated,other medical reason):     PACU assessment of acute postoperative pain prior to Anesthesia Care End- Applies to Patients Age = 18- (ABG7)  Initial PACU pain score is which of the following: < 7/10  Patient unable to report pain score: N/A    Post-anesthetic transfer of care checklist/protocol to PACU/ICU- (426 and 427)  Upon conclusion of case, patient transferred to which of the following locations: PACU/Non-ICU  Use of transfer checklist/protocol: Yes  Exclusion: Service Performed in Patient Hospital Room (and thus did not require transfer): N/A  Unplanned admission to ICU related to anesthesia service up through end of PACU care- (MD51)  Unplanned admission to ICU (not initially anticipated at anesthesia start time): No

## 2020-09-12 NOTE — OR NURSING
The pt is awake and oriented. Respirations are regular and easy. Pain is controlled, the pt is comfortable. Dressing dry and intact.Chest tube patent and draining.

## 2020-09-12 NOTE — CARE PLAN
Problem: Communication  Goal: The ability to communicate needs accurately and effectively will improve  Outcome: PROGRESSING AS EXPECTED     Problem: Safety  Goal: Will remain free from injury  Outcome: PROGRESSING AS EXPECTED  Goal: Will remain free from falls  Outcome: PROGRESSING AS EXPECTED     Problem: Knowledge Deficit  Goal: Knowledge of disease process/condition, treatment plan, diagnostic tests, and medications will improve  Outcome: PROGRESSING AS EXPECTED  Goal: Knowledge of the prescribed therapeutic regimen will improve  Outcome: PROGRESSING AS EXPECTED     Problem: Pain Management  Goal: Pain level will decrease to patient's comfort goal  Outcome: PROGRESSING AS EXPECTED     Problem: Respiratory:  Goal: Respiratory status will improve  Outcome: PROGRESSING AS EXPECTED

## 2020-09-12 NOTE — OR SURGEON
Immediate Post OP Note    PreOp Diagnosis:   1.  Spontaneous pneumothorax, right    PostOp Diagnosis: Same    Procedure(s):  1.  Right THORACOSCOPY, wedge resection, upper lobe, mechanical and CHEMICAL PLEUREDESIS - Wound Class: Clean Contaminated    Surgeon(s):  Brian Desai M.D.    Anesthesiologist/Type of Anesthesia:  Anesthesiologist: Brandyn Sanchez M.D./General    Surgical Staff:  Circulator: Lukas D. Gansert, R.N.  Scrub Person: Anita A Reyes, R.N.; Sabino Lo    Specimens removed if any:  ID Type Source Tests Collected by Time Destination   A : RIGHT UPPER LOBE WEDGE Tissue Lung PATHOLOGY SPECIMEN Brian Desai M.D. 9/12/2020  8:16 AM        Estimated Blood Loss: 25 mL    Findings: Small apical adhesion, tiny apical blebs/imperfections in the lung surface    Complications: None noted    Outcome: Transferred to PACU in stable condition    9/12/2020 8:33 AM Brian Desai M.D.

## 2020-09-13 ENCOUNTER — APPOINTMENT (OUTPATIENT)
Dept: RADIOLOGY | Facility: MEDICAL CENTER | Age: 40
DRG: 165 | End: 2020-09-13
Attending: SURGERY
Payer: COMMERCIAL

## 2020-09-13 PROBLEM — J93.83 SPONTANEOUS PNEUMOTHORAX: Status: ACTIVE | Noted: 2020-09-13

## 2020-09-13 PROCEDURE — 700111 HCHG RX REV CODE 636 W/ 250 OVERRIDE (IP): Performed by: SURGERY

## 2020-09-13 PROCEDURE — 700105 HCHG RX REV CODE 258: Performed by: SURGERY

## 2020-09-13 PROCEDURE — 700102 HCHG RX REV CODE 250 W/ 637 OVERRIDE(OP): Performed by: SURGERY

## 2020-09-13 PROCEDURE — 71045 X-RAY EXAM CHEST 1 VIEW: CPT

## 2020-09-13 PROCEDURE — 770006 HCHG ROOM/CARE - MED/SURG/GYN SEMI*

## 2020-09-13 PROCEDURE — A9270 NON-COVERED ITEM OR SERVICE: HCPCS | Performed by: SURGERY

## 2020-09-13 RX ADMIN — ACETAMINOPHEN 1000 MG: 500 TABLET ORAL at 05:34

## 2020-09-13 RX ADMIN — OXYCODONE 5 MG: 5 TABLET ORAL at 05:40

## 2020-09-13 RX ADMIN — IBUPROFEN 800 MG: 800 TABLET, FILM COATED ORAL at 18:00

## 2020-09-13 RX ADMIN — ACETAMINOPHEN 1000 MG: 500 TABLET ORAL at 13:15

## 2020-09-13 RX ADMIN — HEPARIN SODIUM 5000 UNITS: 5000 INJECTION, SOLUTION INTRAVENOUS; SUBCUTANEOUS at 05:34

## 2020-09-13 RX ADMIN — ACETAMINOPHEN 1000 MG: 500 TABLET ORAL at 17:28

## 2020-09-13 RX ADMIN — IBUPROFEN 800 MG: 800 TABLET, FILM COATED ORAL at 05:34

## 2020-09-13 RX ADMIN — SODIUM CHLORIDE: 9 INJECTION, SOLUTION INTRAVENOUS at 01:28

## 2020-09-13 RX ADMIN — IBUPROFEN 800 MG: 800 TABLET, FILM COATED ORAL at 13:15

## 2020-09-13 RX ADMIN — HEPARIN SODIUM 5000 UNITS: 5000 INJECTION, SOLUTION INTRAVENOUS; SUBCUTANEOUS at 21:43

## 2020-09-13 RX ADMIN — HEPARIN SODIUM 5000 UNITS: 5000 INJECTION, SOLUTION INTRAVENOUS; SUBCUTANEOUS at 13:15

## 2020-09-13 ASSESSMENT — PAIN DESCRIPTION - PAIN TYPE
TYPE: ACUTE PAIN;SURGICAL PAIN
TYPE: ACUTE PAIN
TYPE: SURGICAL PAIN
TYPE: SURGICAL PAIN

## 2020-09-13 NOTE — PROGRESS NOTES
· 2 RN skin check complete Tahni RN  · Devices in place left CT, SCDs  · Skin assessed under devices YES  · Confirmed pressure ulcers found NONE  · The following interventions are in place - Pressure redistributing mattress, pillows in place for repositioning and pt T/R independently

## 2020-09-13 NOTE — PROGRESS NOTES
0107-9883: Aox4. VSS on RA. No c/o pain. Continent b/b, urinal at the bedside. No BM this shift. Skin per flowsheet. CT to suction at all times. Safety maintained. WCTM.

## 2020-09-13 NOTE — CARE PLAN
Problem: Communication  Goal: The ability to communicate needs accurately and effectively will improve  Outcome: PROGRESSING AS EXPECTED  Note: Updated on POC, pt able to communicate needs appropriately, and call light is within reach     Problem: Venous Thromboembolism (VTW)/Deep Vein Thrombosis (DVT) Prevention:  Goal: Patient will participate in Venous Thrombosis (VTE)/Deep Vein Thrombosis (DVT)Prevention Measures  Outcome: PROGRESSING AS EXPECTED     Problem: Bowel/Gastric:  Goal: Normal bowel function is maintained or improved  Outcome: PROGRESSING AS EXPECTED  Goal: Will not experience complications related to bowel motility  Outcome: PROGRESSING AS EXPECTED

## 2020-09-13 NOTE — PROGRESS NOTES
Assumed care at 0700    Received report from NOC shift RN.    Reviewed recent lab results, notes, orders, and MAR  POC discussed and updated on care board  Bed is in the lowest and locked position, call light within reach      A&Ox4  RA sats >91%  Heart rate can abdoul down to low 50's   Right tube to continuous suction.   No leak detected, output is serosanguinous dressing is CDI  IS 2500  C/o pain in lower right back   Tolerable per patient with medication  Tolerating regular diet   Stopped IVF  +voiding  +flatus  Stand by to self ambulatory   Patient is remain on suction when ambulating.

## 2020-09-13 NOTE — PROGRESS NOTES
Bedside report received.  Assessment complete.  A&O x 4. Patient calls appropriately.  Patient ambulates with standby assist. Bed alarm off.   Patient has 2/10 pain. Pain managed with prescribed medications.  Denies N&V. Tolerating reg diet.  Surgical dressings CDI.  *** void, *** flatus, *** BM.  Patient denies SOB.  SCD's ***.  Patient ***.  Review plan with of care with patient. Call light and personal belongings with in reach. Hourly rounding in place. All needs met at this time.

## 2020-09-13 NOTE — PROGRESS NOTES
Progress Note:  9/13/2020, 10:30 AM    S: no acute events. Breathing ok. Pain ok    O:  /74   Pulse 62   Temp 36.6 °C (97.8 °F) (Temporal)   Resp 17   Ht 1.829 m (6')   Wt 71 kg (156 lb 8.4 oz)   SpO2 96%     NAD, awake, alert  Breathing nonlabored on room air  R chest tube to suction, no air leak, serous output    A:   Active Hospital Problems    Diagnosis   • Spontaneous pneumothorax [J93.83]   s/p R VATS blebectomy and pleurodesis  Progressing as expected      P:   -continue chest tube to suction at all times  -Ambulate on portable suction QID  -aggressive pulm hygiene  -plan for chest tube removal and d/c tuesday    Brian Desai M.D.  [facility]  [phone]

## 2020-09-14 LAB — PATHOLOGY CONSULT NOTE: NORMAL

## 2020-09-14 PROCEDURE — 770006 HCHG ROOM/CARE - MED/SURG/GYN SEMI*

## 2020-09-14 PROCEDURE — 700102 HCHG RX REV CODE 250 W/ 637 OVERRIDE(OP): Performed by: SURGERY

## 2020-09-14 PROCEDURE — A9270 NON-COVERED ITEM OR SERVICE: HCPCS | Performed by: SURGERY

## 2020-09-14 PROCEDURE — 700111 HCHG RX REV CODE 636 W/ 250 OVERRIDE (IP): Performed by: SURGERY

## 2020-09-14 RX ADMIN — ACETAMINOPHEN 1000 MG: 500 TABLET ORAL at 22:44

## 2020-09-14 RX ADMIN — IBUPROFEN 800 MG: 800 TABLET, FILM COATED ORAL at 12:02

## 2020-09-14 RX ADMIN — DOCUSATE SODIUM 100 MG: 100 CAPSULE ORAL at 05:31

## 2020-09-14 RX ADMIN — HEPARIN SODIUM 5000 UNITS: 5000 INJECTION, SOLUTION INTRAVENOUS; SUBCUTANEOUS at 14:40

## 2020-09-14 RX ADMIN — ACETAMINOPHEN 1000 MG: 500 TABLET ORAL at 05:31

## 2020-09-14 RX ADMIN — HEPARIN SODIUM 5000 UNITS: 5000 INJECTION, SOLUTION INTRAVENOUS; SUBCUTANEOUS at 05:31

## 2020-09-14 RX ADMIN — HEPARIN SODIUM 5000 UNITS: 5000 INJECTION, SOLUTION INTRAVENOUS; SUBCUTANEOUS at 22:45

## 2020-09-14 RX ADMIN — IBUPROFEN 800 MG: 800 TABLET, FILM COATED ORAL at 18:42

## 2020-09-14 RX ADMIN — IBUPROFEN 800 MG: 800 TABLET, FILM COATED ORAL at 05:31

## 2020-09-14 RX ADMIN — ACETAMINOPHEN 1000 MG: 500 TABLET ORAL at 12:02

## 2020-09-14 RX ADMIN — ACETAMINOPHEN 1000 MG: 500 TABLET ORAL at 18:42

## 2020-09-14 ASSESSMENT — PAIN DESCRIPTION - PAIN TYPE
TYPE: ACUTE PAIN
TYPE: ACUTE PAIN;SURGICAL PAIN

## 2020-09-14 NOTE — PROGRESS NOTES
Assumed care at 0700    Received report from NOC shift RN.    Reviewed recent lab results, notes, orders, and MAR  POC discussed and updated on care board  Bed is in the lowest and locked position, call light within reach      A&Ox4  RA  Right sided chest tube   Sites are CDI   No leak detected   Continue on continuous wall suction/portable suction  Is 2800  Crepitus above chest tube insertion site.  MD aware  Tolerating diet  +voiding  +bm  Denies pain  Up self and ambulatory

## 2020-09-14 NOTE — DISCHARGE PLANNING
Care Transition Team Assessment    LSW met with the patient at bedside. The patient verified the demographic information in the Facsheet.    The patient reported he lives at home with his wife. Prior to admission, he was independent with ADL's.    The patient reported he does not own [employer]on DME. The patient does not have a PCP; however, he prefers to establish with a PCP of     his choice. Based on the information provided by this patient, the anticipated discharge disposition is home.    Information Source  Orientation : Oriented x 4  Information Given By: Patient  Informant's Name: (Lyndon)  Who is responsible for making decisions for patient? : Patient    Readmission Evaluation  Is this a readmission?: No    Elopement Risk  Legal Hold: No  Ambulatory or Self Mobile in Wheelchair: Yes  Disoriented: No  Psychiatric Symptoms: None  History of Wandering: No  Elopement this Admit: No  Vocalizing Wanting to Leave: No  Displays Behaviors, Body Language Wanting to Leave: No-Not at Risk for Elopement  Elopement Risk: Not at Risk for Elopement    Interdisciplinary Discharge Planning  Patient or legal guardian wants to designate a caregiver: No    Discharge Preparedness  What is your plan after discharge?: Home with help  What are your discharge supports?: Spouse  Prior Functional Level: Independent with Activities of Daily Living    Functional Assessment  Prior Functional Level: Independent with Activities of Daily Living         Vision / Hearing Impairment  Vision Impairment : No  Hearing Impairment : No         Advance Directive  Advance Directive?: None    Domestic Abuse  Have you ever been the victim of abuse or violence?: No  Physical Abuse or Sexual Abuse: No  Verbal Abuse or Emotional Abuse: No  Possible Abuse/Neglect Reported to:: Not Applicable    Psychological Assessment  History of Substance Abuse: None  History of Psychiatric Problems: No    Discharge Risks or Barriers  Discharge risks or barriers?: No  PCP  Patient risk factors: No PCP    Anticipated Discharge Information  Discharge Disposition: Discharged to home/self care (01)

## 2020-09-14 NOTE — CARE PLAN
Problem: Venous Thromboembolism (VTW)/Deep Vein Thrombosis (DVT) Prevention:  Goal: Patient will participate in Venous Thrombosis (VTE)/Deep Vein Thrombosis (DVT)Prevention Measures  Outcome: PROGRESSING AS EXPECTED  Intervention: Encourage ambulation/mobilization at level directed by Physical Therapy in collaboration with Interdisciplinary Team  Note: Pt ambulates independently, self motivated.      Problem: Bowel/Gastric:  Goal: Normal bowel function is maintained or improved  Outcome: PROGRESSING SLOWER THAN EXPECTED  Note: Pt has not had a BM since admission, refusing scheduled stool softeners.      Problem: Respiratory:  Goal: Respiratory status will improve  Intervention: Educate and encourage incentive spirometry usage  Note: Pt pulling 2500 on IS. Strong effort.

## 2020-09-14 NOTE — PROGRESS NOTES
Progress Note:  9/14/2020, 8:18 AM    S: No acute events.  Breathing ok. Pain ok    O:  /80   Pulse 64   Temp 36.7 °C (98 °F) (Temporal)   Resp 17   Ht 1.829 m (6')   Wt 71 kg (156 lb 8.4 oz)   SpO2 93%     NAD, awake, alert  Breathing nonlabored  R chest tube to suction, no air leak  R chest incisions c/d/i      A:   Active Hospital Problems    Diagnosis   • Spontaneous pneumothorax [J93.83]         P:   -Chest tube to suction another 24hrs  -plan to remove tube and d/c early tomorrow AM    Brian Desai M.D.  [facility]  [phone]

## 2020-09-14 NOTE — PROGRESS NOTES
Pt A&O x4.    Vitals: /79   Pulse 60   Temp 36.3 °C (97.3 °F) (Temporal)   Resp 17   Ht 1.829 m (6')   Wt 71 kg (156 lb 8.4 oz)   SpO2 94%   BMI 21.23 kg/m²     Utilizing heat packs to help soothe the pain, declines PRN pain medications.      Neuro: JACKSON. Denies new onset of numbness/ tingling.    Cardiac: Denies new onset of chest pain.    Vascular: Pulses 2+ BUE, BLE. No edema noted.    Respiratory: Lungs sound diminished throughout. Pulling 2500 on IS, effective, strong effort. On RA.  on, satting in 90's. Denies SOB. Right chest tube in place, to 20 cm suction, pt remains on suction at all times, small amount of serosanguinous drainage, no air leaks noted. Crepitus noted around chest tube insertion site following down to lower back as pt had mentioned. Crepitus extends mildly into right upper chest.     GI: Abdomen soft. Normoactive bowel sounds, + flatus, - BM today. Refusing scheduled stool softeners, educated, pt is more willing to take them in the morning. On regular diet, tolerating well. - nausea/ vomiting.    : Pt voiding adequately.      MSK: Pt up to bathroom self, no assistance required, tolerating well.    Integumentary: Right chest tube dressing CDI, observed, no drainage noted, changed on previous shift. Right chest previous chest tube insertion site wound CDI, approximated, band-aid, no drainage. All other skin intact.     Labs noted.    Fall precautions in place: Bed locked in lowest position, Upper bed rails up, treaded socks in place, personal belongings within reach, call light within reach, appropriate mobility signs in place, - bed alarm. Pt calls appropriately.     Pt updated on POC.

## 2020-09-15 VITALS
HEART RATE: 60 BPM | WEIGHT: 156.53 LBS | SYSTOLIC BLOOD PRESSURE: 127 MMHG | OXYGEN SATURATION: 95 % | HEIGHT: 72 IN | DIASTOLIC BLOOD PRESSURE: 88 MMHG | BODY MASS INDEX: 21.2 KG/M2 | RESPIRATION RATE: 18 BRPM | TEMPERATURE: 98 F

## 2020-09-15 PROBLEM — J93.83 SPONTANEOUS PNEUMOTHORAX: Status: RESOLVED | Noted: 2020-09-13 | Resolved: 2020-09-15

## 2020-09-15 PROCEDURE — 700102 HCHG RX REV CODE 250 W/ 637 OVERRIDE(OP): Performed by: SURGERY

## 2020-09-15 PROCEDURE — A9270 NON-COVERED ITEM OR SERVICE: HCPCS | Performed by: SURGERY

## 2020-09-15 PROCEDURE — 700111 HCHG RX REV CODE 636 W/ 250 OVERRIDE (IP): Performed by: SURGERY

## 2020-09-15 RX ORDER — OXYCODONE HYDROCHLORIDE 5 MG/1
5 TABLET ORAL EVERY 6 HOURS PRN
Qty: 12 TAB | Refills: 0 | Status: SHIPPED | OUTPATIENT
Start: 2020-09-15 | End: 2020-09-18

## 2020-09-15 RX ORDER — ACETAMINOPHEN 500 MG
1000 TABLET ORAL EVERY 6 HOURS
Qty: 56 TAB | Refills: 0 | Status: SHIPPED | OUTPATIENT
Start: 2020-09-15 | End: 2020-09-22

## 2020-09-15 RX ORDER — IBUPROFEN 800 MG/1
800 TABLET ORAL
Qty: 21 TAB | Refills: 0 | Status: SHIPPED | OUTPATIENT
Start: 2020-09-15 | End: 2020-09-22

## 2020-09-15 RX ADMIN — IBUPROFEN 800 MG: 800 TABLET, FILM COATED ORAL at 05:11

## 2020-09-15 RX ADMIN — ACETAMINOPHEN 1000 MG: 500 TABLET ORAL at 05:11

## 2020-09-15 RX ADMIN — DOCUSATE SODIUM 100 MG: 100 CAPSULE ORAL at 05:12

## 2020-09-15 RX ADMIN — HEPARIN SODIUM 5000 UNITS: 5000 INJECTION, SOLUTION INTRAVENOUS; SUBCUTANEOUS at 05:11

## 2020-09-15 ASSESSMENT — PAIN DESCRIPTION - PAIN TYPE
TYPE: ACUTE PAIN
TYPE: ACUTE PAIN

## 2020-09-15 NOTE — DISCHARGE PLANNING
Anticipated Discharge Disposition: home with outpatient follow-up    Action:   · Completed chart review  · Pt admitted with CXR showing a large right-sided pneumothorax   · POD#3- chest tube removed today  · Diet advanced, pain controled with PO medication     Barriers to Discharge: none    Plan: Continue to collaborate with the pt, pt's family, and health care team to provide social and discharge support as needed.

## 2020-09-15 NOTE — DISCHARGE INSTRUCTIONS
Discharge Instructions    Discharged to home by car with relative. Discharged via walking, hospital escort: Yes.  Special equipment needed: Not Applicable    Be sure to schedule a follow-up appointment with your primary care doctor or any specialists as instructed.     Discharge Plan:   Diet Plan: Discussed  Activity Level: Discussed  Confirmed Follow up Appointment: Patient to Call and Schedule Appointment  Confirmed Symptoms Management: Discussed  Medication Reconciliation Updated: Yes    I understand that a diet low in cholesterol, fat, and sodium is recommended for good health. Unless I have been given specific instructions below for another diet, I accept this instruction as my diet prescription.   Other diet: regular    Special Instructions: None    · Is patient discharged on Warfarin / Coumadin?   No     Depression / Suicide Risk    As you are discharged from this [facility] facility, it is important to learn how to keep safe from harming yourself.    Recognize the warning signs:  · Abrupt changes in personality, positive or negative- including increase in energy   · Giving away possessions  · Change in eating patterns- significant weight changes-  positive or negative  · Change in sleeping patterns- unable to sleep or sleeping all the time   · Unwillingness or inability to communicate  · Depression  · Unusual sadness, discouragement and loneliness  · Talk of wanting to die  · Neglect of personal appearance   · Rebelliousness- reckless behavior  · Withdrawal from people/activities they love  · Confusion- inability to concentrate     If you or a loved one observes any of these behaviors or has concerns about self-harm, here's what you can do:  · Talk about it- your feelings and reasons for harming yourself  · Remove any means that you might use to hurt yourself (examples: pills, rope, extension cords, firearm)  · Get professional help from the community (Mental Health, Substance Abuse, psychological  counseling)  · Do not be alone:Call your Safe Contact- someone whom you trust who will be there for you.  · Call your local CRISIS HOTLINE 879-9924 or 478-452-0966  · Call your local Children's Mobile Crisis Response Team [place] (842) 670-3494 or www.Worksurfers  · Call the toll free National Suicide Prevention Hotlines   · National Suicide Prevention Lifeline 543-301-PQMQ (3372)  · National Hope Line Network 800-SUICIDE (738-7708)

## 2020-09-15 NOTE — DISCHARGE SUMMARY
"Discharge Summary    CHIEF COMPLAINT ON ADMISSION  Chief Complaint   Patient presents with   • Sent from Urgent Care     Pt reports right sided chest pain, SOB starting 1 week ago. Xray at urgent care shows \"very large right sided Pnemothorax.\" Pt denies trauma. Denies hx of such   • Chest Pain   • Shortness of Breath       Reason for Admission  Spontaneous pneumothorax    Admission Date  9/11/2020    CODE STATUS  Full Code    HPI & HOSPITAL COURSE  This is a 40 y.o. male admitted for spontaneous right pneumothorax requiring a chest tube.  Subsequently he underwent R VATS blebectomy & pleurodesis, which hetolerated well.  Chest tube was removed on POD 3.  At time of discharge the patient was eating, voiding, ambulating, and pain was controlled on oral medication.     Exam at time of discharge:  NAD, awake, alert  Breathing nonlabored  R chest incisions c/d/i        Therefore, he is discharged in good and stable condition to home with close outpatient follow-up.      Discharge Date  9/15/2020      DISCHARGE DIAGNOSES  Active Problems:    * No active hospital problems. *  Resolved Problems:    Spontaneous pneumothorax POA: Yes      FOLLOW UP  Follow up with me 1-2 weeks  Brian Desai M.D.  [facility]  [phone]    MEDICATIONS ON DISCHARGE     Medication List      START taking these medications      Instructions   acetaminophen 500 MG Tabs  Commonly known as: TYLENOL   Take 2 Tabs by mouth every 6 hours for 7 days.  Dose: 1,000 mg     oxyCODONE immediate-release 5 MG Tabs  Commonly known as: ROXICODONE   Take 1 Tab by mouth every 6 hours as needed for Severe Pain (breakthrough postoperative pain) for up to 3 days.  Dose: 5 mg        CHANGE how you take these medications      Instructions   ibuprofen 800 MG Tabs  What changed:   · medication strength  · how much to take  · when to take this  · reasons to take this  Commonly known as: MOTRIN   Take 1 Tab by mouth 3 times a day, with meals for 7 " days.  Dose: 800 mg        STOP taking these medications    methylPREDNISolone 4 MG Tbpk  Commonly known as: MEDROL DOSEPAK            Allergies  No Known Allergies    DIET  Orders Placed This Encounter   Procedures   • Diet Order Regular (advance to regular as tolerated)     Standing Status:   Standing     Number of Occurrences:   1     Order Specific Question:   Diet:     Answer:   Regular [1]     Comments:   advance to regular as tolerated       ACTIVITY  As tolerated.  Weight bearing as tolerated    CONSULTATIONS  none    PROCEDURES  9/12/20  1.  Right THORACOSCOPY, wedge resection, upper lobe, mechanical and CHEMICAL PLEUREDESIS -    LABORATORY  Lab Results   Component Value Date    SODIUM 141 09/11/2020    POTASSIUM 4.2 09/11/2020    CHLORIDE 103 09/11/2020    CO2 23 09/11/2020    GLUCOSE 106 (H) 09/11/2020    BUN 17 09/11/2020    CREATININE 1.18 09/11/2020        Lab Results   Component Value Date    WBC 6.8 09/11/2020    HEMOGLOBIN 17.1 09/11/2020    HEMATOCRIT 48.6 09/11/2020    PLATELETCT 268 09/11/2020        Total time of the discharge process exceeds 15 minutes.      D/C instructions:    1. DIET: Upon discharge from the hospital you may resume your normal pre-operative diet.     2. ACTIVITIES: After discharge from the hospital, you may resume full routine activities, as tolerated.    3. DRIVING: You may drive whenever you are off pain medications and are able to perform the activities needed to drive, i.e. turning, bending, twisting, etc.    4. BATHING: You may get the wound wet at any time after leaving the hospital. You may shower, but do not submerge in a bath for at least a week. Dressings may come off after 48 hours.    5. BOWEL FUNCTION: A few patients, after this operation, will develop either frequent or loose stools after meals. This usually corrects itself after a few days, to a few weeks. If this occurs, do not worry; it is not unusual and will resolve. Much more common than loose stools, is  constipation. The combination of pain medication and decreased activity level can cause constipation in otherwise normal patients. If you feel this is occurring, take a laxative (Milk of Magnesia, Ex-Lax, Senokot, etc.) until the problem has resolved.    6. PAIN MEDICATION: You will be given a prescription for pain medication at discharge. Please take these as directed. It is important to remember not to take medications on an empty stomach as this may cause nausea.    7. CALL IF YOU HAVE: (1) shortness of breath or fevers to more than 101.5 degrees F, (2) Unusual chest or leg pain, (3) Drainage or fluid from incision that may be foul smelling, increased tenderness or soreness at the wound or the wound edges are no longer together, redness or swelling at the incision site. Please do not hesitate to call with any other questions.     8. APPOINTMENT: Contact our office at [phone] for a follow-up appointment in 1 to 2 weeks following your procedure.    If you have any additional questions, please do not hesitate to call the office and speak to either myself or the physician on call.    Office address:   [address] [address], [place] [address]      Brian Desai M.D.  [facility]  [phone]

## 2020-09-15 NOTE — PROGRESS NOTES
Discharge paper work signed.  Education provided about surgical incision, showering, , IS, and pain medication.  All questions answered at this time.

## 2020-09-15 NOTE — PROGRESS NOTES
Assumed care at 0700    Received report from NOC shift RN.    Reviewed recent lab results, notes, orders, and MAR  POC discussed and updated on care board  Bed is in the lowest and locked position, call light within reach      A&Ox4  Denies pain  RA  Right chest tube removed this AM  Site is CDI  Patient is ambulating in the hallway  +voiding  +bm 9/14/2020  Tolerating diet

## 2020-09-15 NOTE — PROGRESS NOTES
Assumed care of patient at 0000   Report received from NOC RN Ifeoma     Pt resting comfortably at this time   Hourly rounding in place

## 2020-09-15 NOTE — PROGRESS NOTES
Report received from Adela HEREDIA  Assumed care of patient at 1900.    Pt is A&O x 4.  Pain reported as tolerable. Pt only wishes to take his scheduled pain medication.   Nausea denied   Tolerating Regular Diet   Surgical incision and chest tube to rt chest. Dressing CDI. Scant subcutaneous air felt under rt arm. Pt denies SOB. Pt sating in the 90's on RA.   + Urine output  + BM    + Flatus  Up self with mobile suction for chest tube.   Bed in lowest position and locked.  Pt resting comfortably now.  Review plan of care with patient  Call light within reach  Hourly rounds in place  All needs met at this time

## 2020-09-22 NOTE — PROGRESS NOTES
Bedside report received. Assessment completed.  Pt is A&O x4. Pt on room air.   Medicating for pain PRN per MAR.  Denies nausea.   - numbness, - tingling.  R chest pleurvac in place.   LDA 18 L FA NS @ 100 ml/hr.   Last BM PTA . +flatus,   +void.  NPO   Pt up self.  Call light and belongings within reach. All needs met at this time. Fall Precautions and hourly rounding in place.   Care of patient assumed at bedside following nurse to nurse report. Patient medicated for c/o pain at this time with Toradol. Vitals and assessment obtained. Gown changed/ yao care given. Provided clear liquid diet of lemon icee, ginger ale, jello. No other needs at this time. Call light within reach.

## 2022-08-16 ENCOUNTER — HOSPITAL ENCOUNTER (OUTPATIENT)
Dept: LAB | Facility: MEDICAL CENTER | Age: 42
End: 2022-08-16
Attending: FAMILY MEDICINE
Payer: COMMERCIAL

## 2022-08-16 LAB
ALBUMIN SERPL BCP-MCNC: 5 G/DL (ref 3.2–4.9)
ALBUMIN/GLOB SERPL: 2 G/DL
ALP SERPL-CCNC: 59 U/L (ref 30–99)
ALT SERPL-CCNC: 11 U/L (ref 2–50)
ANION GAP SERPL CALC-SCNC: 11 MMOL/L (ref 7–16)
AST SERPL-CCNC: 17 U/L (ref 12–45)
BASOPHILS # BLD AUTO: 1 % (ref 0–1.8)
BASOPHILS # BLD: 0.05 K/UL (ref 0–0.12)
BILIRUB SERPL-MCNC: 0.9 MG/DL (ref 0.1–1.5)
BUN SERPL-MCNC: 12 MG/DL (ref 8–22)
CALCIUM SERPL-MCNC: 9.6 MG/DL (ref 8.5–10.5)
CHLORIDE SERPL-SCNC: 102 MMOL/L (ref 96–112)
CHOLEST SERPL-MCNC: 199 MG/DL (ref 100–199)
CO2 SERPL-SCNC: 25 MMOL/L (ref 20–33)
CREAT SERPL-MCNC: 0.94 MG/DL (ref 0.5–1.4)
EOSINOPHIL # BLD AUTO: 0.08 K/UL (ref 0–0.51)
EOSINOPHIL NFR BLD: 1.6 % (ref 0–6.9)
ERYTHROCYTE [DISTWIDTH] IN BLOOD BY AUTOMATED COUNT: 41.8 FL (ref 35.9–50)
EST. AVERAGE GLUCOSE BLD GHB EST-MCNC: 103 MG/DL
FASTING STATUS PATIENT QL REPORTED: NORMAL
GFR SERPLBLD CREATININE-BSD FMLA CKD-EPI: 103 ML/MIN/1.73 M 2
GLOBULIN SER CALC-MCNC: 2.5 G/DL (ref 1.9–3.5)
GLUCOSE SERPL-MCNC: 94 MG/DL (ref 65–99)
HBA1C MFR BLD: 5.2 % (ref 4–5.6)
HCT VFR BLD AUTO: 45.8 % (ref 42–52)
HDLC SERPL-MCNC: 50 MG/DL
HGB BLD-MCNC: 16.1 G/DL (ref 14–18)
IMM GRANULOCYTES # BLD AUTO: 0.01 K/UL (ref 0–0.11)
IMM GRANULOCYTES NFR BLD AUTO: 0.2 % (ref 0–0.9)
LDLC SERPL CALC-MCNC: 128 MG/DL
LYMPHOCYTES # BLD AUTO: 1.71 K/UL (ref 1–4.8)
LYMPHOCYTES NFR BLD: 34.5 % (ref 22–41)
MCH RBC QN AUTO: 31 PG (ref 27–33)
MCHC RBC AUTO-ENTMCNC: 35.2 G/DL (ref 33.7–35.3)
MCV RBC AUTO: 88.1 FL (ref 81.4–97.8)
MONOCYTES # BLD AUTO: 0.37 K/UL (ref 0–0.85)
MONOCYTES NFR BLD AUTO: 7.5 % (ref 0–13.4)
NEUTROPHILS # BLD AUTO: 2.73 K/UL (ref 1.82–7.42)
NEUTROPHILS NFR BLD: 55.2 % (ref 44–72)
NRBC # BLD AUTO: 0 K/UL
NRBC BLD-RTO: 0 /100 WBC
PLATELET # BLD AUTO: 257 K/UL (ref 164–446)
PMV BLD AUTO: 11.6 FL (ref 9–12.9)
POTASSIUM SERPL-SCNC: 4.4 MMOL/L (ref 3.6–5.5)
PROT SERPL-MCNC: 7.5 G/DL (ref 6–8.2)
PSA SERPL-MCNC: 0.81 NG/ML (ref 0–4)
RBC # BLD AUTO: 5.2 M/UL (ref 4.7–6.1)
SODIUM SERPL-SCNC: 138 MMOL/L (ref 135–145)
T4 FREE SERPL-MCNC: 1.29 NG/DL (ref 0.93–1.7)
TRIGL SERPL-MCNC: 104 MG/DL (ref 0–149)
TSH SERPL DL<=0.005 MIU/L-ACNC: 1.18 UIU/ML (ref 0.38–5.33)
VIT B12 SERPL-MCNC: 359 PG/ML (ref 211–911)
WBC # BLD AUTO: 5 K/UL (ref 4.8–10.8)

## 2022-08-16 PROCEDURE — 80053 COMPREHEN METABOLIC PANEL: CPT

## 2022-08-16 PROCEDURE — 83036 HEMOGLOBIN GLYCOSYLATED A1C: CPT

## 2022-08-16 PROCEDURE — 80061 LIPID PANEL: CPT

## 2022-08-16 PROCEDURE — 84153 ASSAY OF PSA TOTAL: CPT

## 2022-08-16 PROCEDURE — 36415 COLL VENOUS BLD VENIPUNCTURE: CPT

## 2022-08-16 PROCEDURE — 82607 VITAMIN B-12: CPT

## 2022-08-16 PROCEDURE — 82306 VITAMIN D 25 HYDROXY: CPT

## 2022-08-16 PROCEDURE — 84443 ASSAY THYROID STIM HORMONE: CPT

## 2022-08-16 PROCEDURE — 85025 COMPLETE CBC W/AUTO DIFF WBC: CPT

## 2022-08-16 PROCEDURE — 84439 ASSAY OF FREE THYROXINE: CPT

## 2022-08-17 LAB — 25(OH)D3 SERPL-MCNC: 30 NG/ML (ref 30–100)

## 2022-09-08 ENCOUNTER — APPOINTMENT (OUTPATIENT)
Dept: RADIOLOGY | Facility: IMAGING CENTER | Age: 42
End: 2022-09-08
Payer: COMMERCIAL

## 2022-09-08 ENCOUNTER — OFFICE VISIT (OUTPATIENT)
Dept: URGENT CARE | Facility: PHYSICIAN GROUP | Age: 42
End: 2022-09-08
Payer: COMMERCIAL

## 2022-09-08 VITALS
TEMPERATURE: 97.8 F | DIASTOLIC BLOOD PRESSURE: 62 MMHG | RESPIRATION RATE: 20 BRPM | SYSTOLIC BLOOD PRESSURE: 108 MMHG | OXYGEN SATURATION: 96 % | WEIGHT: 165 LBS | HEART RATE: 86 BPM | HEIGHT: 72 IN | BODY MASS INDEX: 22.35 KG/M2

## 2022-09-08 DIAGNOSIS — R07.89 STERNAL PAIN: ICD-10-CM

## 2022-09-08 DIAGNOSIS — J93.9 PNEUMOTHORAX, UNSPECIFIED TYPE: ICD-10-CM

## 2022-09-08 DIAGNOSIS — Z87.09 HISTORY OF PNEUMOTHORAX: ICD-10-CM

## 2022-09-08 PROBLEM — J43.9 PULMONARY EMPHYSEMA (HCC): Status: ACTIVE | Noted: 2021-03-02

## 2022-09-08 PROCEDURE — 71046 X-RAY EXAM CHEST 2 VIEWS: CPT | Mod: TC | Performed by: RADIOLOGY

## 2022-09-08 PROCEDURE — 99214 OFFICE O/P EST MOD 30 MIN: CPT

## 2022-09-08 ASSESSMENT — ENCOUNTER SYMPTOMS
CONSTITUTIONAL NEGATIVE: 1
NEUROLOGICAL NEGATIVE: 1
MUSCULOSKELETAL NEGATIVE: 1
GASTROINTESTINAL NEGATIVE: 1
EYES NEGATIVE: 1
HEMOPTYSIS: 0
SPUTUM PRODUCTION: 0
CARDIOVASCULAR NEGATIVE: 1
SHORTNESS OF BREATH: 0
COUGH: 0

## 2022-09-08 ASSESSMENT — FIBROSIS 4 INDEX: FIB4 SCORE: 0.84

## 2022-09-08 NOTE — PROGRESS NOTES
"Subjective     Lyndon Pasquale Sigala is a 42 y.o. male who presents with Other (Pt states that he thinks he has a collapsed lung on the left side- 3x days )            HPI    Patient presents with symptoms that started 4 days ago.  He endorses sternal pain, which she describes as \"sore\", 3-4 out of 10, radiating to the left side of the back and left shoulder.  Patient reports has been taking ibuprofen, which provided no relief.  He denies any recent trauma to the chest or back.  He further denies any shortness of breath. Patient with history of spontaneous right pneumothorax requiring chest tube on 9/11/2020.  Patient reports he feels the same way as stated 2 years ago when he had spontaneous pneumothorax.    Patient's current problem list, medications, and past medical/surgical history were reviewed in Epic.    PMH:  has no past medical history on file.  MEDS: No current outpatient medications on file.  ALLERGIES: No Known Allergies  SURGHX:   Past Surgical History:   Procedure Laterality Date    CA THORACOSCOPY,DX NO BX Right 9/12/2020    Procedure: THORACOSCOPY, BLEBECTOMY, CHEMICAL PLEUREDESIS;  Surgeon: Brian Desai M.D.;  Location: SURGERY [facility];  Service: General     SOCHX:  reports that he quit smoking about 9 years ago. His smoking use included cigarettes. He has never used smokeless tobacco. He reports current alcohol use. He reports that he does not currently use drugs.  FH: Reviewed with patient, not pertinent to this visit.     Review of Systems   Constitutional: Negative.    HENT: Negative.     Eyes: Negative.    Respiratory:  Negative for cough, hemoptysis, sputum production and shortness of breath.    Cardiovascular: Negative.    Gastrointestinal: Negative.    Genitourinary: Negative.    Musculoskeletal: Negative.         Sternal pain   Skin: Negative.    Neurological: Negative.             Objective     /62 (BP Location: Right arm, Patient Position: Sitting, BP Cuff Size: Adult)   " Pulse 86   Temp 36.6 °C (97.8 °F) (Temporal)   Resp 20   Ht 1.829 m (6')   Wt 74.8 kg (165 lb)   SpO2 96%   BMI 22.38 kg/m²      Physical Exam  Constitutional:       Appearance: Normal appearance.   HENT:      Head: Normocephalic.      Nose: Nose normal.   Eyes:      Extraocular Movements: Extraocular movements intact.   Cardiovascular:      Rate and Rhythm: Normal rate and regular rhythm.      Pulses: Normal pulses.      Heart sounds: Normal heart sounds.   Pulmonary:      Effort: Pulmonary effort is normal.      Breath sounds: Normal breath sounds.   Musculoskeletal:         General: Normal range of motion.      Cervical back: Normal range of motion.   Skin:     General: Skin is warm.   Neurological:      General: No focal deficit present.      Mental Status: He is alert.   Psychiatric:         Mood and Affect: Mood normal.         Behavior: Behavior normal.                           Assessment & Plan          1. Pneumothorax, unspecified type      2. History of pneumothorax    - DX-CHEST-2 VIEWS; Future    3. Sternal pain    - DX-CHEST-2 VIEWS; Future       Discussed chest x-ray results with patient which showed a small left apical pneumothorax.  Patient was advised ibuprofen up to 800 mg every 8 hours as needed for pain relief.  Patient denies any severe pain or shortness of breath.  Patient was advised to go to the emergency room with worsening symptoms such as shortness of breath, increasing pain, chest pain.  He was instructed to avoid any physically strenuous activities.  Patient is requesting referral to pulmonary medicine.  Referral placed.  Discussed treatment plan with patient, he is agreeable and verbalized understanding.  Educated patient on signs and symptoms watch out for, when to return to the clinic or go to the ER.      Electronically Signed by LAURA Gilmore

## 2022-09-12 ENCOUNTER — APPOINTMENT (RX ONLY)
Dept: URBAN - METROPOLITAN AREA CLINIC 20 | Facility: CLINIC | Age: 42
Setting detail: DERMATOLOGY
End: 2022-09-12

## 2022-09-12 DIAGNOSIS — L82.1 OTHER SEBORRHEIC KERATOSIS: ICD-10-CM

## 2022-09-12 DIAGNOSIS — L81.4 OTHER MELANIN HYPERPIGMENTATION: ICD-10-CM

## 2022-09-12 DIAGNOSIS — L72.8 OTHER FOLLICULAR CYSTS OF THE SKIN AND SUBCUTANEOUS TISSUE: ICD-10-CM

## 2022-09-12 DIAGNOSIS — D18.0 HEMANGIOMA: ICD-10-CM

## 2022-09-12 DIAGNOSIS — D22 MELANOCYTIC NEVI: ICD-10-CM

## 2022-09-12 DIAGNOSIS — L57.8 OTHER SKIN CHANGES DUE TO CHRONIC EXPOSURE TO NONIONIZING RADIATION: ICD-10-CM

## 2022-09-12 PROBLEM — D18.01 HEMANGIOMA OF SKIN AND SUBCUTANEOUS TISSUE: Status: ACTIVE | Noted: 2022-09-12

## 2022-09-12 PROBLEM — D22.5 MELANOCYTIC NEVI OF TRUNK: Status: ACTIVE | Noted: 2022-09-12

## 2022-09-12 PROBLEM — D23.39 OTHER BENIGN NEOPLASM OF SKIN OF OTHER PARTS OF FACE: Status: ACTIVE | Noted: 2022-09-12

## 2022-09-12 PROCEDURE — ? COUNSELING

## 2022-09-12 PROCEDURE — 99203 OFFICE O/P NEW LOW 30 MIN: CPT

## 2022-09-12 ASSESSMENT — LOCATION SIMPLE DESCRIPTION DERM
LOCATION SIMPLE: LEFT UPPER ARM
LOCATION SIMPLE: LEFT FOREARM
LOCATION SIMPLE: RIGHT UPPER ARM
LOCATION SIMPLE: CHEST
LOCATION SIMPLE: RIGHT CHEEK
LOCATION SIMPLE: RIGHT THIGH
LOCATION SIMPLE: LEFT THIGH
LOCATION SIMPLE: RIGHT UPPER BACK
LOCATION SIMPLE: LEFT CHEEK
LOCATION SIMPLE: RIGHT FOREARM

## 2022-09-12 ASSESSMENT — LOCATION DETAILED DESCRIPTION DERM
LOCATION DETAILED: RIGHT DISTAL DORSAL FOREARM
LOCATION DETAILED: LEFT INFERIOR CENTRAL MALAR CHEEK
LOCATION DETAILED: LEFT SUPERIOR CENTRAL BUCCAL CHEEK
LOCATION DETAILED: RIGHT ANTERIOR PROXIMAL UPPER ARM
LOCATION DETAILED: RIGHT CENTRAL MALAR CHEEK
LOCATION DETAILED: LEFT DISTAL DORSAL FOREARM
LOCATION DETAILED: RIGHT MID-UPPER BACK
LOCATION DETAILED: LEFT ANTERIOR DISTAL THIGH
LOCATION DETAILED: RIGHT INFERIOR UPPER BACK
LOCATION DETAILED: RIGHT ANTERIOR DISTAL THIGH
LOCATION DETAILED: LEFT ANTERIOR PROXIMAL UPPER ARM
LOCATION DETAILED: RIGHT SUPERIOR MEDIAL UPPER BACK
LOCATION DETAILED: LEFT MEDIAL SUPERIOR CHEST

## 2022-09-12 ASSESSMENT — LOCATION ZONE DERM
LOCATION ZONE: ARM
LOCATION ZONE: LEG
LOCATION ZONE: FACE
LOCATION ZONE: TRUNK

## 2022-09-15 ENCOUNTER — HOSPITAL ENCOUNTER (EMERGENCY)
Facility: MEDICAL CENTER | Age: 42
End: 2022-09-15
Attending: EMERGENCY MEDICINE
Payer: COMMERCIAL

## 2022-09-15 ENCOUNTER — APPOINTMENT (OUTPATIENT)
Dept: RADIOLOGY | Facility: MEDICAL CENTER | Age: 42
End: 2022-09-15
Attending: EMERGENCY MEDICINE
Payer: COMMERCIAL

## 2022-09-15 VITALS
HEIGHT: 72 IN | BODY MASS INDEX: 23.17 KG/M2 | SYSTOLIC BLOOD PRESSURE: 132 MMHG | OXYGEN SATURATION: 95 % | HEART RATE: 74 BPM | TEMPERATURE: 97.1 F | DIASTOLIC BLOOD PRESSURE: 76 MMHG | RESPIRATION RATE: 16 BRPM | WEIGHT: 171.08 LBS

## 2022-09-15 DIAGNOSIS — J93.11 PRIMARY SPONTANEOUS PNEUMOTHORAX: ICD-10-CM

## 2022-09-15 DIAGNOSIS — R07.89 OTHER CHEST PAIN: ICD-10-CM

## 2022-09-15 LAB
ALBUMIN SERPL BCP-MCNC: 5 G/DL (ref 3.2–4.9)
ALBUMIN/GLOB SERPL: 1.7 G/DL
ALP SERPL-CCNC: 68 U/L (ref 30–99)
ALT SERPL-CCNC: 18 U/L (ref 2–50)
ANION GAP SERPL CALC-SCNC: 12 MMOL/L (ref 7–16)
AST SERPL-CCNC: 21 U/L (ref 12–45)
BASOPHILS # BLD AUTO: 0.9 % (ref 0–1.8)
BASOPHILS # BLD: 0.05 K/UL (ref 0–0.12)
BILIRUB SERPL-MCNC: 0.7 MG/DL (ref 0.1–1.5)
BUN SERPL-MCNC: 12 MG/DL (ref 8–22)
CALCIUM SERPL-MCNC: 9.9 MG/DL (ref 8.5–10.5)
CHLORIDE SERPL-SCNC: 102 MMOL/L (ref 96–112)
CO2 SERPL-SCNC: 26 MMOL/L (ref 20–33)
CREAT SERPL-MCNC: 1.07 MG/DL (ref 0.5–1.4)
EKG IMPRESSION: NORMAL
EOSINOPHIL # BLD AUTO: 0.06 K/UL (ref 0–0.51)
EOSINOPHIL NFR BLD: 1.1 % (ref 0–6.9)
ERYTHROCYTE [DISTWIDTH] IN BLOOD BY AUTOMATED COUNT: 40.9 FL (ref 35.9–50)
GFR SERPLBLD CREATININE-BSD FMLA CKD-EPI: 89 ML/MIN/1.73 M 2
GLOBULIN SER CALC-MCNC: 2.9 G/DL (ref 1.9–3.5)
GLUCOSE SERPL-MCNC: 102 MG/DL (ref 65–99)
HCT VFR BLD AUTO: 47.2 % (ref 42–52)
HGB BLD-MCNC: 16.7 G/DL (ref 14–18)
IMM GRANULOCYTES # BLD AUTO: 0.02 K/UL (ref 0–0.11)
IMM GRANULOCYTES NFR BLD AUTO: 0.4 % (ref 0–0.9)
LYMPHOCYTES # BLD AUTO: 1.44 K/UL (ref 1–4.8)
LYMPHOCYTES NFR BLD: 25.7 % (ref 22–41)
MCH RBC QN AUTO: 31.1 PG (ref 27–33)
MCHC RBC AUTO-ENTMCNC: 35.4 G/DL (ref 33.7–35.3)
MCV RBC AUTO: 87.9 FL (ref 81.4–97.8)
MONOCYTES # BLD AUTO: 0.35 K/UL (ref 0–0.85)
MONOCYTES NFR BLD AUTO: 6.3 % (ref 0–13.4)
NEUTROPHILS # BLD AUTO: 3.68 K/UL (ref 1.82–7.42)
NEUTROPHILS NFR BLD: 65.6 % (ref 44–72)
NRBC # BLD AUTO: 0 K/UL
NRBC BLD-RTO: 0 /100 WBC
PLATELET # BLD AUTO: 270 K/UL (ref 164–446)
PMV BLD AUTO: 10.9 FL (ref 9–12.9)
POTASSIUM SERPL-SCNC: 4.4 MMOL/L (ref 3.6–5.5)
PROT SERPL-MCNC: 7.9 G/DL (ref 6–8.2)
RBC # BLD AUTO: 5.37 M/UL (ref 4.7–6.1)
SODIUM SERPL-SCNC: 140 MMOL/L (ref 135–145)
TROPONIN T SERPL-MCNC: <6 NG/L (ref 6–19)
WBC # BLD AUTO: 5.6 K/UL (ref 4.8–10.8)

## 2022-09-15 PROCEDURE — 84484 ASSAY OF TROPONIN QUANT: CPT

## 2022-09-15 PROCEDURE — 93005 ELECTROCARDIOGRAM TRACING: CPT | Performed by: EMERGENCY MEDICINE

## 2022-09-15 PROCEDURE — 93005 ELECTROCARDIOGRAM TRACING: CPT

## 2022-09-15 PROCEDURE — 71045 X-RAY EXAM CHEST 1 VIEW: CPT

## 2022-09-15 PROCEDURE — 85025 COMPLETE CBC W/AUTO DIFF WBC: CPT

## 2022-09-15 PROCEDURE — 36415 COLL VENOUS BLD VENIPUNCTURE: CPT

## 2022-09-15 PROCEDURE — 80053 COMPREHEN METABOLIC PANEL: CPT

## 2022-09-15 PROCEDURE — 99283 EMERGENCY DEPT VISIT LOW MDM: CPT

## 2022-09-15 ASSESSMENT — HEART SCORE
RISK FACTORS: NO KNOWN RISK FACTORS
ECG: NON-SPECIFIC REPOLARIZATION DISTURBANCE
TROPONIN: LESS THAN OR EQUAL TO NORMAL LIMIT
HEART SCORE: 1
AGE: <45
HISTORY: SLIGHTLY SUSPICIOUS

## 2022-09-15 ASSESSMENT — ENCOUNTER SYMPTOMS: SHORTNESS OF BREATH: 0

## 2022-09-15 ASSESSMENT — FIBROSIS 4 INDEX: FIB4 SCORE: 0.84

## 2022-09-15 NOTE — ED PROVIDER NOTES
"ED Provider Note    Scribed for Manuel Burgess M.D. by Joo Braga. 9/15/2022, 12:34 PM.    Primary care provider: Phillip Avery M.D.  Means of arrival: Walk in  History obtained from: patient  History limited by: None    CHIEF COMPLAINT  Chief Complaint   Patient presents with    Chest Pain     Pt reports that he has a history of spontaneously collapsing bilateral lungs. He states that his left lung collapsed 2 weeks ago he thinks and he just wants to make sure he is okay to fly internationally in two weeks time. Pt denies any pain, but states when he bends over he feels a \"bubble sensation.\" Pt was seen at urgent care 1 week ago and was told that his chest Xray looks \"good.\" Pt states that in 2020 he did need surgery and a chest tube on the right side.        HPI  Lyndon Sigala is a 42 y.o. male who presents to the Emergency Department for evaluation of a collapsed lung onset 2 weeks ago. He wants to know if he is cleared for flight. Patient states he has a history of spontaneous pneumothorax and visited urgent care a week ago. At that time he was diagnosed with a small pneumothorax but he noticed his symptoms began 2 weeks ago. Patient reports associated symptoms of pain while bending down but denies any shortness of breath. He notes in 2020 he had a talc pleurodesis done to his right lung. Patient denies any other pertinent medical history, daily medications, or allergies to medications. He notes he drinks alcohol but denies any other drug use. Patient states he follows with Dr. Schultz for his chronic pneumothorax.      REVIEW OF SYSTEMS  Review of Systems   Respiratory:  Negative for shortness of breath.    Musculoskeletal:         Positive for pain while bending down   All other systems reviewed and are negative.  C    PAST MEDICAL HISTORY   None noted when reviewed    SURGICAL HISTORY   has a past surgical history that includes thoracoscopy,dx no bx (Right, 9/12/2020).     SOCIAL " HISTORY  Social History     Tobacco Use    Smoking status: Former     Packs/day: 0.00     Types: Cigarettes     Quit date: 9/16/2012     Years since quitting: 10.0    Smokeless tobacco: Never   Vaping Use    Vaping Use: Never used   Substance Use Topics    Alcohol use: Yes     Comment: occ    Drug use: Not Currently     Comment: marijuana      Social History     Substance and Sexual Activity   Drug Use Not Currently    Comment: marijuana       FAMILY HISTORY  None noted when revuewed    CURRENT MEDICATIONS  Home Medications       Reviewed by Carrie Randhawa R.N. (Registered Nurse) on 09/15/22 at 0921  Med List Status: Not Addressed     Medication Last Dose Status        Patient Darío Taking any Medications                           ALLERGIES  No Known Allergies    PHYSICAL EXAM  VITAL SIGNS: /83   Pulse 82   Temp 36.3 °C (97.3 °F) (Temporal)   Resp 16   Ht 1.829 m (6')   Wt 77.6 kg (171 lb 1.2 oz)   SpO2 96%   BMI 23.20 kg/m²   Vitals reviewed.  Constitutional: Well developed, Well nourished, No acute distress, Non-toxic appearance.   HENT: Normocephalic, Atraumatic, Bilateral external ears normal, Oropharynx moist, No oral exudates, Nose normal.   Eyes: PERRL, EOMI, Conjunctiva normal, No discharge.   Neck: Normal range of motion, No tenderness, Supple, No stridor.   Cardiovascular: Normal heart rate, Normal rhythm, No murmurs, No rubs, No gallops.   Thorax & Lungs: Normal breath sounds, No respiratory distress, No wheezing, No chest tenderness.   Abdomen: Bowel sounds normal, Soft, No tenderness  Skin: Warm, Dry, No erythema, No rash.   Back: No tenderness, No CVA tenderness.   Musculoskeletal: Good range of motion in all major joints.   Neurologic: Alert,n o focal deficits noted.   Psychiatric: Affect normal      LABS  Results for orders placed or performed during the hospital encounter of 09/15/22   CBC with Differential   Result Value Ref Range    WBC 5.6 4.8 - 10.8 K/uL    RBC 5.37 4.70 - 6.10  M/uL    Hemoglobin 16.7 14.0 - 18.0 g/dL    Hematocrit 47.2 42.0 - 52.0 %    MCV 87.9 81.4 - 97.8 fL    MCH 31.1 27.0 - 33.0 pg    MCHC 35.4 (H) 33.7 - 35.3 g/dL    RDW 40.9 35.9 - 50.0 fL    Platelet Count 270 164 - 446 K/uL    MPV 10.9 9.0 - 12.9 fL    Neutrophils-Polys 65.60 44.00 - 72.00 %    Lymphocytes 25.70 22.00 - 41.00 %    Monocytes 6.30 0.00 - 13.40 %    Eosinophils 1.10 0.00 - 6.90 %    Basophils 0.90 0.00 - 1.80 %    Immature Granulocytes 0.40 0.00 - 0.90 %    Nucleated RBC 0.00 /100 WBC    Neutrophils (Absolute) 3.68 1.82 - 7.42 K/uL    Lymphs (Absolute) 1.44 1.00 - 4.80 K/uL    Monos (Absolute) 0.35 0.00 - 0.85 K/uL    Eos (Absolute) 0.06 0.00 - 0.51 K/uL    Baso (Absolute) 0.05 0.00 - 0.12 K/uL    Immature Granulocytes (abs) 0.02 0.00 - 0.11 K/uL    NRBC (Absolute) 0.00 K/uL   Complete Metabolic Panel (CMP)   Result Value Ref Range    Sodium 140 135 - 145 mmol/L    Potassium 4.4 3.6 - 5.5 mmol/L    Chloride 102 96 - 112 mmol/L    Co2 26 20 - 33 mmol/L    Anion Gap 12.0 7.0 - 16.0    Glucose 102 (H) 65 - 99 mg/dL    Bun 12 8 - 22 mg/dL    Creatinine 1.07 0.50 - 1.40 mg/dL    Calcium 9.9 8.5 - 10.5 mg/dL    AST(SGOT) 21 12 - 45 U/L    ALT(SGPT) 18 2 - 50 U/L    Alkaline Phosphatase 68 30 - 99 U/L    Total Bilirubin 0.7 0.1 - 1.5 mg/dL    Albumin 5.0 (H) 3.2 - 4.9 g/dL    Total Protein 7.9 6.0 - 8.2 g/dL    Globulin 2.9 1.9 - 3.5 g/dL    A-G Ratio 1.7 g/dL   Troponin   Result Value Ref Range    Troponin T <6 6 - 19 ng/L   ESTIMATED GFR   Result Value Ref Range    GFR (CKD-EPI) 89 >60 mL/min/1.73 m 2   EKG   Result Value Ref Range    Report       [facility] Emergency Dept.    Test Date:  2022-09-15  Pt Name:    SUSHIL WRAY                 Department: ER  MRN:        [id number]                      Room:  Gender:     Male                         Technician: [id number]  :        1980                   Requested By:ER TRIAGE PROTOCOL  Order #:    [id number]                    Reading  MD: JESUS ALFARO. ARNOLD    Measurements  Intervals                                Axis  Rate:       84                           P:          67  HI:         140                          QRS:        76  QRSD:       96                           T:          38  QT:         348  QTc:        412    Interpretive Statements  Sinus rhythm  RSR' in V1 or V2, right VCD or RVH  Compared to ECG 09/11/2020 18:35:24  Right ventricular hypertrophy now present  RSR' in V1 or V2 now present  Incomplete right bundle-branch block no longer present  Electronically Signed On 9- 14:01:05 PDT by JESUS ALFARO. [facility]         All labs reviewed by me.    EKG Interpretation  12 Lead EKG interpreted by me as seen above    RADIOLOGY  DX-CHEST-PORTABLE (1 VIEW)   Final Result      1.  Tiny left apical pneumothorax, slightly smaller since prior study.   2.  No new consolidation or pleural effusions.           The radiologist's interpretation of all radiological studies have been reviewed by me.    COURSE & MEDICAL DECISION MAKING  Pertinent Labs & Imaging studies reviewed. (See chart for details)    Obtained and reviewed past medical records which revealed a talc pleurodesis to his right lung done on 9/13//2020.  Chart is urgent based on labs and previous work-up for comparison including CT scan.    12:34 PM Patient seen and examined at bedside. The patient presents with pain while bending down without shortness of breath, and the differential diagnosis includes but is not limited to spontaneous pneumo versus other causes of chest pain pneumonia, PE, ACS.. Ordered for EKG, Dx-Chest, CBC With diff, CMP, and Troponin to evaluate.     1:10 PM - Patient was reevaluated at bedside. Discussed radiology results with the patient and informed them of presence of pneumothorax. Cautioned against flying and had a discussion about possible avenues for treatment.     The patient does not have evidence of ACS.  He is a clear-cut alternative diagnosis,  he has a pneumothorax he has an EKG that is abnormal but unchanged and normal troponin.  His heart score is 1.  This pain in his chest from his pneumothorax and not ACS.  He does not have pleuritic pain.  He is PERC negative.  Does not require D-dimer work-up does not have clinical history to suggest a dissection.    1:19 PM - I discussed the patient's case and the above findings with interventional radiology who would not recommend aspirating the patient if he is improving.  I spoke with Dr. De Los Santos.  He feels that the pneumothorax is small and if the patient is improving there is risk of making it worse with an invasive procedure.  I think this is reasonable in her prep recommendation.  He feels the patient does not likely have a significant benefit.    1:39 PM - I discussed the patient's case and the above findings with Dr. Monique (Pulmonary) who has reviewed the patient's x-ray both recent and today.  Feels a small and would not benefit from procedure here in the ED.  Recommends that he does not fly and he follows the guidelines of no flight sooner than 2 weeks status post complete resolution.  I have communicated this to the patient.  Confirmed to have an appointment to see the patient in the office in 5 days.    1:55 PM - Patient was reevaluated at bedside. Discussed lab and radiology results with the patient and informed them of plans for discharge. Gave return precautions and instructed them to follow up with their pulmonologist.      Patient understands to follow-up with pulmonology.  He will minimize strenuous activity like heavy lifting or significant exertion.  He will follow-up with pulmonology.  Return of worsening pain shortness of breath or other concerns.  Questions answered, is agreeable to plan he is discharged in good condition.    The patient will return for new or worsening symptoms and is stable at the time of discharge.    The patient is referred to a primary physician for blood pressure  management, diabetic screening, and for all other preventative health concerns.    DISPOSITION:  Patient will be discharged home in stable condition.    FOLLOW UP:  Phillip Avery M.D.  [address] [facility] [address]  [facility] [id number] [phone]          Francisco Javier Schultz M.D.  [address] [facility] [address]  [place] [id number] [phone]          FINAL IMPRESSION  1. Other chest pain    2. Primary spontaneous pneumothorax          Joo SPRINGER (Johanne), am scribing for, and in the presence of, Manuel Burgess M.D..    Electronically signed by: Joo Braga (Johanne), 9/15/2022    Manuel SPRINGER M.D. personally performed the services described in this documentation, as scribed by Joo Braga in my presence, and it is both accurate and complete.      The note accurately reflects work and decisions made by me.  Manuel Burgess M.D.  9/15/2022  8:23 PM

## 2022-09-15 NOTE — DISCHARGE INSTRUCTIONS
Return to the emergency department for worsening pain, shortness of breath, fever, cough or other concerns.  Avoid exertion.  Follow-up with your pulmonologist as planned and your doctor.  Return for any new medical problems or complaints.

## 2022-09-15 NOTE — ED NOTES
Patient given discharge instructions and education. Verbalizes understanding. Given chance to ask questions. Patient educated on signs and symptoms to returned to ER, Educated patient they can return for any concerning symptoms. Patient states they have their belongings. Denies additional needs at this time. Reports pain is well controlled. Patient monitored every hour and PRN for safety and comfort. Patient ambulatory out of department.

## 2022-09-15 NOTE — ED TRIAGE NOTES
"Chief Complaint   Patient presents with    Chest Pain     Pt reports that he has a history of spontaneously collapsing bilateral lungs. He states that his left lung collapsed 2 weeks ago he thinks and he just wants to make sure he is okay to fly internationally in two weeks time. Pt denies any pain, but states when he bends over he feels a \"bubble sensation.\" Pt was seen at urgent care 1 week ago and was told that his chest Xray looks \"good.\" Pt states that in 2020 he did need surgery and a chest tube on the right side.        BP (!) 140/103   Pulse (!) 130   Temp 36 °C (96.8 °F) (Temporal)   Resp 16   Ht 1.829 m (6')   Wt 77.6 kg (171 lb 1.2 oz)   SpO2 97%   BMI 23.20 kg/m²     Pt is ambulatory in and out of triage. Appropriate PPE worn throughout entire encounter. Pt placed back in the lobby and educated about triage process.    "

## 2022-09-19 ASSESSMENT — ENCOUNTER SYMPTOMS
WHEEZING: 0
DYSPNEA AT REST: 0
SHORTNESS OF BREATH: 0
RESPIRATORY SYMPTOMS COMMENTS: NO
HEMOPTYSIS: 0
CHEST TIGHTNESS: 0

## 2022-09-20 ENCOUNTER — OFFICE VISIT (OUTPATIENT)
Dept: SLEEP MEDICINE | Facility: MEDICAL CENTER | Age: 42
End: 2022-09-20
Payer: COMMERCIAL

## 2022-09-20 VITALS
HEIGHT: 72 IN | BODY MASS INDEX: 22.97 KG/M2 | HEART RATE: 81 BPM | WEIGHT: 169.56 LBS | DIASTOLIC BLOOD PRESSURE: 70 MMHG | OXYGEN SATURATION: 95 % | SYSTOLIC BLOOD PRESSURE: 108 MMHG

## 2022-09-20 DIAGNOSIS — J93.9 RECURRENT PNEUMOTHORAX: ICD-10-CM

## 2022-09-20 DIAGNOSIS — Z87.891 FORMER SMOKER: ICD-10-CM

## 2022-09-20 PROBLEM — J43.9 PULMONARY EMPHYSEMA (HCC): Status: RESOLVED | Noted: 2021-03-02 | Resolved: 2022-09-20

## 2022-09-20 PROCEDURE — 99205 OFFICE O/P NEW HI 60 MIN: CPT | Performed by: INTERNAL MEDICINE

## 2022-09-20 ASSESSMENT — ENCOUNTER SYMPTOMS
WHEEZING: 0
HEMOPTYSIS: 0
SHORTNESS OF BREATH: 0

## 2022-09-20 ASSESSMENT — PATIENT HEALTH QUESTIONNAIRE - PHQ9: CLINICAL INTERPRETATION OF PHQ2 SCORE: 0

## 2022-09-20 ASSESSMENT — FIBROSIS 4 INDEX: FIB4 SCORE: 0.77

## 2022-09-21 NOTE — ASSESSMENT & PLAN NOTE
1 pack/day x 10 years, quit 2012  Currently smokes THC, advised to consider switching to edibles instead

## 2022-09-21 NOTE — ASSESSMENT & PLAN NOTE
Spontaneous nontraumatic pneumothorax on the right side in 2020, underwent blebectomy and mechanical/doxycycline pleurodesis. His CT at that time showed basilar blebs bilaterally    Now with PTX on the left which is stable however not resolving on serial CXR. He is a tall thin man so fits the demographic profile for spontaneous pneumothoraces.  He denies any joint laxity or dislocations to suggest Marfan's or Gayla-Danlos.  No pectus excavatum on exam.  No family history of spontaneous pneumothoraces, spontaneous aortic aneurysms, or lymphangioleiomyomatosis/TSC.  -- strongly advised against air travel right now. ED precautions reviewed  -- CT chest noncontrast   -- Referral to thoracic surgery for left blebectomy/pleurodesis given recurrent PTX based on symptoms/pt history  -- Monitor annually for bleb development postoperatively, if ongoing will need workup for ED/Marfans/YOUNG

## 2022-09-21 NOTE — PROGRESS NOTES
"Pulmonary Clinic- Initial Consult    Date of Service: 9/20/2022    Referring Physician: Kailee Nelson A.P.*    Reason for Consult: Recurrent pneumothoraces    Chief Complaint:   Chief Complaint   Patient presents with    Establish Care     Referred by Kailee SANCHEZ for History of pneumothorax/Pneumothorax, unspecified type    Other     CXR 09/15/22     HPI:   Lyndon Sigala is a very pleasant 42 y.o. male former tobacco smoker 10 pack years quit 2012 referred to the pulmonary clinic for recurrent pneumothoraces.  Since he was 18 years old he reports sensation of a \"popping\" in his chest which would come and go, migrate to various areas of his chest and back.  They would self resolve after couple weeks.  Never obtained any imaging or required hospitalization for these issues. They were associated with significant pain for which he actually had to undergo chiropractic care and was on disability from work for a period of time.      In 2020 he was hospitalized for a right pneumothorax, and underwent a right VATS blebectomy and mechanical/doxycycline pleurodesis by Dr. Desai.  He has been stable since however had a similar episode/pain sensation several weeks ago, was seen in urgent care and was found to have a spontaneous left pneumothorax.  No trauma history.  He was followed up in the ER a week or 2 later and showed persistent left pneumothorax.  CT chest from his hospitalization in 2020 shows basilar blebs in the left lung.  He has been stable, his pain has been manageable, no supplemental oxygen requirements.    Interestingly, he does swim recreationally where he does long breath-hold maneuvers.  No diving/scuba diving    He is a tall thin man so fits the demographic profile for spontaneous pneumothoraces.  He denies any joint laxity or dislocations to suggest Marfan's or Gayla-Danlos.  No pectus excavatum on exam.  No family history of spontaneous pneumothoraces, spontaneous aortic aneurysms, or " lymphangioleiomyomatosis/TSC.    Past Medical History:   Diagnosis Date    Chickenpox     Wears glasses      Past Surgical History:   Procedure Laterality Date    UT THORACOSCOPY,DX NO BX Right 9/12/2020    Procedure: THORACOSCOPY, BLEBECTOMY, CHEMICAL PLEUREDESIS;  Surgeon: Brian Desai M.D.;  Location: SURGERY [facility];  Service: General     Social History     Socioeconomic History    Marital status: Single     Spouse name: Not on file    Number of children: Not on file    Years of education: Not on file    Highest education level: Not on file   Occupational History    Not on file   Tobacco Use    Smoking status: Former     Packs/day: 0.00     Types: Cigarettes     Quit date: 9/16/2012     Years since quitting: 10.0    Smokeless tobacco: Never   Vaping Use    Vaping Use: Never used   Substance and Sexual Activity    Alcohol use: Yes     Alcohol/week: 1.8 - 3.0 oz     Types: 3 - 5 Cans of beer per week     Comment: occ    Drug use: Not Currently     Comment: marijuana    Sexual activity: Not on file   Other Topics Concern    Not on file   Social History Narrative    Not on file     Social Determinants of Health     Financial Resource Strain: Not on file   Food Insecurity: Not on file   Transportation Needs: Not on file   Physical Activity: Not on file   Stress: Not on file   Social Connections: Not on file   Intimate Partner Violence: Not on file   Housing Stability: Not on file          History reviewed. No pertinent family history.    No current outpatient medications on file prior to visit.     No current facility-administered medications on file prior to visit.     Allergies: Patient has no known allergies.    ROS:   Review of Systems   Respiratory:  Negative for hemoptysis, shortness of breath and wheezing.      Vitals:  /70 (BP Location: Right arm, Patient Position: Sitting, BP Cuff Size: Adult)   Pulse 81   Ht 1.829 m (6')   Wt 76.9 kg (169 lb 9 oz)   SpO2 95%     Physical Exam:  Physical  Exam  Constitutional:       General: He is not in acute distress.     Appearance: Normal appearance. He is well-developed. He is not diaphoretic.      Comments: Very pleasant   HENT:      Nose: Nose normal.      Mouth/Throat:      Pharynx: No oropharyngeal exudate.   Eyes:      General: No scleral icterus.        Right eye: No discharge.         Left eye: No discharge.      Conjunctiva/sclera: Conjunctivae normal.      Pupils: Pupils are equal, round, and reactive to light.   Neck:      Thyroid: No thyromegaly.      Vascular: No JVD.      Trachea: No tracheal deviation.   Cardiovascular:      Rate and Rhythm: Normal rate and regular rhythm.      Heart sounds: Normal heart sounds. No murmur heard.  Pulmonary:      Effort: Pulmonary effort is normal. No respiratory distress.      Breath sounds: Normal breath sounds. No stridor. No wheezing or rales.   Abdominal:      General: There is no distension.      Palpations: Abdomen is soft.      Tenderness: There is no abdominal tenderness. There is no guarding.   Musculoskeletal:         General: No tenderness. Normal range of motion.      Cervical back: Neck supple.      Comments: No pectus excavatum   Lymphadenopathy:      Cervical: No cervical adenopathy.   Skin:     General: Skin is warm and dry.      Capillary Refill: Capillary refill takes less than 2 seconds.      Coloration: Skin is not pale.      Findings: No erythema.   Neurological:      Mental Status: He is alert and oriented to person, place, and time.      Sensory: No sensory deficit.      Motor: No abnormal muscle tone.      Coordination: Coordination normal.      Deep Tendon Reflexes: Reflexes normal.   Psychiatric:         Behavior: Behavior normal.         Thought Content: Thought content normal.         Judgment: Judgment normal.     Laboratory Data:    PFTs as reviewed by me personally show:  None for review at time of consultation    Imaging as reviewed by me personally show:    CT chest from 2020  reviewed with patient  CXR from urgent care and earlier this week also reviewed    Assessment/Plan:    Problem List Items Addressed This Visit       Recurrent pneumothorax     Spontaneous nontraumatic pneumothorax on the right side in 2020, underwent blebectomy and mechanical/doxycycline pleurodesis. His CT at that time showed basilar blebs bilaterally    Now with PTX on the left which is stable however not resolving on serial CXR. He is a tall thin man so fits the demographic profile for spontaneous pneumothoraces.  He denies any joint laxity or dislocations to suggest Marfan's or Gayla-Danlos.  No pectus excavatum on exam.  No family history of spontaneous pneumothoraces, spontaneous aortic aneurysms, or lymphangioleiomyomatosis/TSC.  -- strongly advised against air travel right now. ED precautions reviewed  -- CT chest noncontrast   -- Referral to thoracic surgery for left blebectomy/pleurodesis given recurrent PTX based on symptoms/pt history  -- Monitor annually for bleb development postoperatively, if ongoing will need workup for ED/Marfans/YOUNG         Relevant Orders    Referral to General Surgery    CT-CHEST (THORAX) W/O    Former smoker     1 pack/day x 10 years, quit 2012  Currently smokes THC, advised to consider switching to edibles instead           Return in about 3 months (around 12/20/2022).     This note was generated using voice recognition software which has a chance of producing errors of grammar and possibly content.  I have made every reasonable attempt to find and correct any obvious errors, but it should be expected that some may not be found prior to finalization of this note.    Time spent in record review prior to patient arrival, reviewing results, and in face-to-face encounter totaled 70 min, excluding any procedures if performed.  __________  Francisco Javier Schultz MD  Pulmonary and Critical Care Medicine  [facility]

## 2022-09-30 ENCOUNTER — HOSPITAL ENCOUNTER (OUTPATIENT)
Dept: RADIOLOGY | Facility: MEDICAL CENTER | Age: 42
End: 2022-09-30
Attending: INTERNAL MEDICINE
Payer: COMMERCIAL

## 2022-09-30 PROCEDURE — 71250 CT THORAX DX C-: CPT

## 2022-11-01 ENCOUNTER — PRE-ADMISSION TESTING (OUTPATIENT)
Dept: ADMISSIONS | Facility: MEDICAL CENTER | Age: 42
DRG: 168 | End: 2022-11-01
Attending: SURGERY
Payer: COMMERCIAL

## 2022-11-01 DIAGNOSIS — Z01.812 PRE-OPERATIVE LABORATORY EXAMINATION: ICD-10-CM

## 2022-11-01 LAB
ANION GAP SERPL CALC-SCNC: 12 MMOL/L (ref 7–16)
BUN SERPL-MCNC: 13 MG/DL (ref 8–22)
CALCIUM SERPL-MCNC: 9.9 MG/DL (ref 8.5–10.5)
CHLORIDE SERPL-SCNC: 100 MMOL/L (ref 96–112)
CO2 SERPL-SCNC: 26 MMOL/L (ref 20–33)
CREAT SERPL-MCNC: 1.03 MG/DL (ref 0.5–1.4)
ERYTHROCYTE [DISTWIDTH] IN BLOOD BY AUTOMATED COUNT: 39.7 FL (ref 35.9–50)
GFR SERPLBLD CREATININE-BSD FMLA CKD-EPI: 93 ML/MIN/1.73 M 2
GLUCOSE SERPL-MCNC: 89 MG/DL (ref 65–99)
HCT VFR BLD AUTO: 45 % (ref 42–52)
HGB BLD-MCNC: 15.8 G/DL (ref 14–18)
MCH RBC QN AUTO: 31 PG (ref 27–33)
MCHC RBC AUTO-ENTMCNC: 35.1 G/DL (ref 33.7–35.3)
MCV RBC AUTO: 88.4 FL (ref 81.4–97.8)
PLATELET # BLD AUTO: 250 K/UL (ref 164–446)
PMV BLD AUTO: 10.2 FL (ref 9–12.9)
POTASSIUM SERPL-SCNC: 4.2 MMOL/L (ref 3.6–5.5)
RBC # BLD AUTO: 5.09 M/UL (ref 4.7–6.1)
SODIUM SERPL-SCNC: 138 MMOL/L (ref 135–145)
WBC # BLD AUTO: 5.7 K/UL (ref 4.8–10.8)

## 2022-11-01 PROCEDURE — 80048 BASIC METABOLIC PNL TOTAL CA: CPT

## 2022-11-01 PROCEDURE — 36415 COLL VENOUS BLD VENIPUNCTURE: CPT

## 2022-11-01 PROCEDURE — 85027 COMPLETE CBC AUTOMATED: CPT

## 2022-11-01 ASSESSMENT — FIBROSIS 4 INDEX: FIB4 SCORE: 0.77

## 2022-11-21 ENCOUNTER — APPOINTMENT (OUTPATIENT)
Dept: RADIOLOGY | Facility: MEDICAL CENTER | Age: 42
DRG: 168 | End: 2022-11-21
Attending: PHYSICIAN ASSISTANT
Payer: COMMERCIAL

## 2022-11-21 ENCOUNTER — HOSPITAL ENCOUNTER (INPATIENT)
Facility: MEDICAL CENTER | Age: 42
LOS: 1 days | DRG: 168 | End: 2022-11-22
Attending: SURGERY | Admitting: SURGERY
Payer: COMMERCIAL

## 2022-11-21 ENCOUNTER — ANESTHESIA (OUTPATIENT)
Dept: SURGERY | Facility: MEDICAL CENTER | Age: 42
DRG: 168 | End: 2022-11-21
Payer: COMMERCIAL

## 2022-11-21 ENCOUNTER — ANESTHESIA EVENT (OUTPATIENT)
Dept: SURGERY | Facility: MEDICAL CENTER | Age: 42
DRG: 168 | End: 2022-11-21
Payer: COMMERCIAL

## 2022-11-21 DIAGNOSIS — G89.18 POSTOPERATIVE PAIN: ICD-10-CM

## 2022-11-21 LAB — PATHOLOGY CONSULT NOTE: NORMAL

## 2022-11-21 PROCEDURE — 160036 HCHG PACU - EA ADDL 30 MINS PHASE I: Performed by: SURGERY

## 2022-11-21 PROCEDURE — 700111 HCHG RX REV CODE 636 W/ 250 OVERRIDE (IP): Performed by: ANESTHESIOLOGY

## 2022-11-21 PROCEDURE — 700105 HCHG RX REV CODE 258: Performed by: PHYSICIAN ASSISTANT

## 2022-11-21 PROCEDURE — 700101 HCHG RX REV CODE 250: Performed by: SURGERY

## 2022-11-21 PROCEDURE — 3E0L4GC INTRODUCTION OF OTHER THERAPEUTIC SUBSTANCE INTO PLEURAL CAVITY, PERCUTANEOUS ENDOSCOPIC APPROACH: ICD-10-PCS | Performed by: SURGERY

## 2022-11-21 PROCEDURE — 160002 HCHG RECOVERY MINUTES (STAT): Performed by: SURGERY

## 2022-11-21 PROCEDURE — 88307 TISSUE EXAM BY PATHOLOGIST: CPT

## 2022-11-21 PROCEDURE — 160009 HCHG ANES TIME/MIN: Performed by: SURGERY

## 2022-11-21 PROCEDURE — A9270 NON-COVERED ITEM OR SERVICE: HCPCS | Performed by: PHYSICIAN ASSISTANT

## 2022-11-21 PROCEDURE — 160029 HCHG SURGERY MINUTES - 1ST 30 MINS LEVEL 4: Performed by: SURGERY

## 2022-11-21 PROCEDURE — 160041 HCHG SURGERY MINUTES - EA ADDL 1 MIN LEVEL 4: Performed by: SURGERY

## 2022-11-21 PROCEDURE — 700102 HCHG RX REV CODE 250 W/ 637 OVERRIDE(OP): Performed by: ANESTHESIOLOGY

## 2022-11-21 PROCEDURE — 71045 X-RAY EXAM CHEST 1 VIEW: CPT

## 2022-11-21 PROCEDURE — 700101 HCHG RX REV CODE 250: Performed by: ANESTHESIOLOGY

## 2022-11-21 PROCEDURE — 700102 HCHG RX REV CODE 250 W/ 637 OVERRIDE(OP): Performed by: PHYSICIAN ASSISTANT

## 2022-11-21 PROCEDURE — 700105 HCHG RX REV CODE 258: Performed by: ANESTHESIOLOGY

## 2022-11-21 PROCEDURE — 700111 HCHG RX REV CODE 636 W/ 250 OVERRIDE (IP): Performed by: PHYSICIAN ASSISTANT

## 2022-11-21 PROCEDURE — 770001 HCHG ROOM/CARE - MED/SURG/GYN PRIV*

## 2022-11-21 PROCEDURE — 160048 HCHG OR STATISTICAL LEVEL 1-5: Performed by: SURGERY

## 2022-11-21 PROCEDURE — 700105 HCHG RX REV CODE 258: Performed by: SURGERY

## 2022-11-21 PROCEDURE — A9270 NON-COVERED ITEM OR SERVICE: HCPCS | Performed by: ANESTHESIOLOGY

## 2022-11-21 PROCEDURE — 160035 HCHG PACU - 1ST 60 MINS PHASE I: Performed by: SURGERY

## 2022-11-21 PROCEDURE — 00541 ANES THRCM PX 1 LUNG VNTJ: CPT | Performed by: ANESTHESIOLOGY

## 2022-11-21 PROCEDURE — 110371 HCHG SHELL REV 272: Performed by: SURGERY

## 2022-11-21 RX ORDER — BUPIVACAINE HYDROCHLORIDE AND EPINEPHRINE 5; 5 MG/ML; UG/ML
INJECTION, SOLUTION EPIDURAL; INTRACAUDAL; PERINEURAL
Status: DISCONTINUED | OUTPATIENT
Start: 2022-11-21 | End: 2022-11-21 | Stop reason: HOSPADM

## 2022-11-21 RX ORDER — DIPHENHYDRAMINE HYDROCHLORIDE 50 MG/ML
25 INJECTION INTRAMUSCULAR; INTRAVENOUS EVERY 6 HOURS PRN
Status: DISCONTINUED | OUTPATIENT
Start: 2022-11-21 | End: 2022-11-22 | Stop reason: HOSPADM

## 2022-11-21 RX ORDER — MEPERIDINE HYDROCHLORIDE 25 MG/ML
12.5 INJECTION INTRAMUSCULAR; INTRAVENOUS; SUBCUTANEOUS
Status: DISCONTINUED | OUTPATIENT
Start: 2022-11-21 | End: 2022-11-21 | Stop reason: HOSPADM

## 2022-11-21 RX ORDER — ONDANSETRON 2 MG/ML
INJECTION INTRAMUSCULAR; INTRAVENOUS PRN
Status: DISCONTINUED | OUTPATIENT
Start: 2022-11-21 | End: 2022-11-21 | Stop reason: SURG

## 2022-11-21 RX ORDER — SODIUM CHLORIDE, SODIUM LACTATE, POTASSIUM CHLORIDE, CALCIUM CHLORIDE 600; 310; 30; 20 MG/100ML; MG/100ML; MG/100ML; MG/100ML
INJECTION, SOLUTION INTRAVENOUS CONTINUOUS
Status: DISCONTINUED | OUTPATIENT
Start: 2022-11-21 | End: 2022-11-21 | Stop reason: HOSPADM

## 2022-11-21 RX ORDER — HYDRALAZINE HYDROCHLORIDE 20 MG/ML
5 INJECTION INTRAMUSCULAR; INTRAVENOUS
Status: DISCONTINUED | OUTPATIENT
Start: 2022-11-21 | End: 2022-11-21 | Stop reason: HOSPADM

## 2022-11-21 RX ORDER — SODIUM CHLORIDE, SODIUM LACTATE, POTASSIUM CHLORIDE, CALCIUM CHLORIDE 600; 310; 30; 20 MG/100ML; MG/100ML; MG/100ML; MG/100ML
INJECTION, SOLUTION INTRAVENOUS CONTINUOUS
Status: ACTIVE | OUTPATIENT
Start: 2022-11-21 | End: 2022-11-21

## 2022-11-21 RX ORDER — HYDRALAZINE HYDROCHLORIDE 20 MG/ML
10 INJECTION INTRAMUSCULAR; INTRAVENOUS EVERY 6 HOURS PRN
Status: DISCONTINUED | OUTPATIENT
Start: 2022-11-21 | End: 2022-11-22 | Stop reason: HOSPADM

## 2022-11-21 RX ORDER — METOPROLOL TARTRATE 1 MG/ML
1 INJECTION, SOLUTION INTRAVENOUS
Status: DISCONTINUED | OUTPATIENT
Start: 2022-11-21 | End: 2022-11-21 | Stop reason: HOSPADM

## 2022-11-21 RX ORDER — DIPHENHYDRAMINE HCL 25 MG
25 TABLET ORAL EVERY 6 HOURS PRN
Status: DISCONTINUED | OUTPATIENT
Start: 2022-11-21 | End: 2022-11-22 | Stop reason: HOSPADM

## 2022-11-21 RX ORDER — OXYCODONE HCL 5 MG/5 ML
5 SOLUTION, ORAL ORAL
Status: COMPLETED | OUTPATIENT
Start: 2022-11-21 | End: 2022-11-21

## 2022-11-21 RX ORDER — ENALAPRILAT 1.25 MG/ML
2.5 INJECTION INTRAVENOUS EVERY 6 HOURS PRN
Status: DISCONTINUED | OUTPATIENT
Start: 2022-11-21 | End: 2022-11-22 | Stop reason: HOSPADM

## 2022-11-21 RX ORDER — FAMOTIDINE 20 MG/1
20 TABLET, FILM COATED ORAL 2 TIMES DAILY
Status: DISCONTINUED | OUTPATIENT
Start: 2022-11-21 | End: 2022-11-22 | Stop reason: HOSPADM

## 2022-11-21 RX ORDER — SODIUM CHLORIDE, SODIUM LACTATE, POTASSIUM CHLORIDE, CALCIUM CHLORIDE 600; 310; 30; 20 MG/100ML; MG/100ML; MG/100ML; MG/100ML
INJECTION, SOLUTION INTRAVENOUS
Status: DISCONTINUED | OUTPATIENT
Start: 2022-11-21 | End: 2022-11-21 | Stop reason: SURG

## 2022-11-21 RX ORDER — ENOXAPARIN SODIUM 100 MG/ML
40 INJECTION SUBCUTANEOUS DAILY
Status: DISCONTINUED | OUTPATIENT
Start: 2022-11-22 | End: 2022-11-22 | Stop reason: HOSPADM

## 2022-11-21 RX ORDER — DEXAMETHASONE SODIUM PHOSPHATE 4 MG/ML
INJECTION, SOLUTION INTRA-ARTICULAR; INTRALESIONAL; INTRAMUSCULAR; INTRAVENOUS; SOFT TISSUE PRN
Status: DISCONTINUED | OUTPATIENT
Start: 2022-11-21 | End: 2022-11-21 | Stop reason: SURG

## 2022-11-21 RX ORDER — MORPHINE SULFATE 4 MG/ML
2 INJECTION INTRAVENOUS
Status: DISCONTINUED | OUTPATIENT
Start: 2022-11-21 | End: 2022-11-22 | Stop reason: HOSPADM

## 2022-11-21 RX ORDER — LIDOCAINE HYDROCHLORIDE 20 MG/ML
INJECTION, SOLUTION EPIDURAL; INFILTRATION; INTRACAUDAL; PERINEURAL PRN
Status: DISCONTINUED | OUTPATIENT
Start: 2022-11-21 | End: 2022-11-21 | Stop reason: SURG

## 2022-11-21 RX ORDER — ROCURONIUM BROMIDE 10 MG/ML
INJECTION, SOLUTION INTRAVENOUS PRN
Status: DISCONTINUED | OUTPATIENT
Start: 2022-11-21 | End: 2022-11-21 | Stop reason: SURG

## 2022-11-21 RX ORDER — ONDANSETRON 2 MG/ML
4 INJECTION INTRAMUSCULAR; INTRAVENOUS
Status: DISCONTINUED | OUTPATIENT
Start: 2022-11-21 | End: 2022-11-21 | Stop reason: HOSPADM

## 2022-11-21 RX ORDER — HYDROMORPHONE HYDROCHLORIDE 1 MG/ML
0.4 INJECTION, SOLUTION INTRAMUSCULAR; INTRAVENOUS; SUBCUTANEOUS
Status: DISCONTINUED | OUTPATIENT
Start: 2022-11-21 | End: 2022-11-21 | Stop reason: HOSPADM

## 2022-11-21 RX ORDER — MIDAZOLAM HYDROCHLORIDE 1 MG/ML
INJECTION INTRAMUSCULAR; INTRAVENOUS PRN
Status: DISCONTINUED | OUTPATIENT
Start: 2022-11-21 | End: 2022-11-21 | Stop reason: SURG

## 2022-11-21 RX ORDER — SODIUM CHLORIDE, SODIUM LACTATE, POTASSIUM CHLORIDE, CALCIUM CHLORIDE 600; 310; 30; 20 MG/100ML; MG/100ML; MG/100ML; MG/100ML
INJECTION, SOLUTION INTRAVENOUS CONTINUOUS
Status: DISCONTINUED | OUTPATIENT
Start: 2022-11-21 | End: 2022-11-22 | Stop reason: HOSPADM

## 2022-11-21 RX ORDER — OXYCODONE HCL 5 MG/5 ML
10 SOLUTION, ORAL ORAL
Status: COMPLETED | OUTPATIENT
Start: 2022-11-21 | End: 2022-11-21

## 2022-11-21 RX ORDER — ONDANSETRON 2 MG/ML
4 INJECTION INTRAMUSCULAR; INTRAVENOUS EVERY 4 HOURS PRN
Status: DISCONTINUED | OUTPATIENT
Start: 2022-11-21 | End: 2022-11-22 | Stop reason: HOSPADM

## 2022-11-21 RX ORDER — DIPHENHYDRAMINE HYDROCHLORIDE 50 MG/ML
12.5 INJECTION INTRAMUSCULAR; INTRAVENOUS
Status: DISCONTINUED | OUTPATIENT
Start: 2022-11-21 | End: 2022-11-21 | Stop reason: HOSPADM

## 2022-11-21 RX ORDER — HYDROMORPHONE HYDROCHLORIDE 1 MG/ML
0.2 INJECTION, SOLUTION INTRAMUSCULAR; INTRAVENOUS; SUBCUTANEOUS
Status: DISCONTINUED | OUTPATIENT
Start: 2022-11-21 | End: 2022-11-21 | Stop reason: HOSPADM

## 2022-11-21 RX ORDER — NAPROXEN SODIUM 220 MG
220 TABLET ORAL 2 TIMES DAILY PRN
COMMUNITY

## 2022-11-21 RX ORDER — MIDAZOLAM HYDROCHLORIDE 1 MG/ML
1 INJECTION INTRAMUSCULAR; INTRAVENOUS
Status: DISCONTINUED | OUTPATIENT
Start: 2022-11-21 | End: 2022-11-21 | Stop reason: HOSPADM

## 2022-11-21 RX ORDER — CEFAZOLIN SODIUM 1 G/3ML
INJECTION, POWDER, FOR SOLUTION INTRAMUSCULAR; INTRAVENOUS PRN
Status: DISCONTINUED | OUTPATIENT
Start: 2022-11-21 | End: 2022-11-21 | Stop reason: SURG

## 2022-11-21 RX ORDER — KETOROLAC TROMETHAMINE 30 MG/ML
30 INJECTION, SOLUTION INTRAMUSCULAR; INTRAVENOUS EVERY 6 HOURS PRN
Status: DISCONTINUED | OUTPATIENT
Start: 2022-11-21 | End: 2022-11-22 | Stop reason: HOSPADM

## 2022-11-21 RX ORDER — HALOPERIDOL 5 MG/ML
1 INJECTION INTRAMUSCULAR
Status: DISCONTINUED | OUTPATIENT
Start: 2022-11-21 | End: 2022-11-21 | Stop reason: HOSPADM

## 2022-11-21 RX ORDER — HYDROCODONE BITARTRATE AND ACETAMINOPHEN 5; 325 MG/1; MG/1
1-2 TABLET ORAL EVERY 4 HOURS PRN
Status: DISCONTINUED | OUTPATIENT
Start: 2022-11-21 | End: 2022-11-22 | Stop reason: HOSPADM

## 2022-11-21 RX ORDER — ONDANSETRON 4 MG/1
4 TABLET, ORALLY DISINTEGRATING ORAL EVERY 4 HOURS PRN
Status: DISCONTINUED | OUTPATIENT
Start: 2022-11-21 | End: 2022-11-22 | Stop reason: HOSPADM

## 2022-11-21 RX ORDER — HYDROMORPHONE HYDROCHLORIDE 1 MG/ML
0.1 INJECTION, SOLUTION INTRAMUSCULAR; INTRAVENOUS; SUBCUTANEOUS
Status: DISCONTINUED | OUTPATIENT
Start: 2022-11-21 | End: 2022-11-21 | Stop reason: HOSPADM

## 2022-11-21 RX ADMIN — FENTANYL CITRATE 100 MCG: 50 INJECTION, SOLUTION INTRAMUSCULAR; INTRAVENOUS at 07:32

## 2022-11-21 RX ADMIN — PROPOFOL 200 MG: 10 INJECTION, EMULSION INTRAVENOUS at 07:32

## 2022-11-21 RX ADMIN — HYDROMORPHONE HYDROCHLORIDE 0.4 MG: 1 INJECTION, SOLUTION INTRAMUSCULAR; INTRAVENOUS; SUBCUTANEOUS at 09:14

## 2022-11-21 RX ADMIN — ONDANSETRON 8 MG: 2 INJECTION INTRAMUSCULAR; INTRAVENOUS at 08:07

## 2022-11-21 RX ADMIN — SUGAMMADEX 200 MG: 100 INJECTION, SOLUTION INTRAVENOUS at 08:09

## 2022-11-21 RX ADMIN — HYDROMORPHONE HYDROCHLORIDE 0.4 MG: 1 INJECTION, SOLUTION INTRAMUSCULAR; INTRAVENOUS; SUBCUTANEOUS at 09:19

## 2022-11-21 RX ADMIN — FAMOTIDINE 20 MG: 20 TABLET, FILM COATED ORAL at 11:45

## 2022-11-21 RX ADMIN — SODIUM CHLORIDE, POTASSIUM CHLORIDE, SODIUM LACTATE AND CALCIUM CHLORIDE: 600; 310; 30; 20 INJECTION, SOLUTION INTRAVENOUS at 07:28

## 2022-11-21 RX ADMIN — SUGAMMADEX 200 MG: 100 INJECTION, SOLUTION INTRAVENOUS at 08:17

## 2022-11-21 RX ADMIN — SODIUM CHLORIDE, POTASSIUM CHLORIDE, SODIUM LACTATE AND CALCIUM CHLORIDE: 600; 310; 30; 20 INJECTION, SOLUTION INTRAVENOUS at 07:08

## 2022-11-21 RX ADMIN — EPHEDRINE SULFATE 10 MG: 50 INJECTION, SOLUTION INTRAVENOUS at 07:50

## 2022-11-21 RX ADMIN — MORPHINE SULFATE 2 MG: 4 INJECTION, SOLUTION INTRAMUSCULAR; INTRAVENOUS at 15:07

## 2022-11-21 RX ADMIN — SODIUM CHLORIDE, POTASSIUM CHLORIDE, SODIUM LACTATE AND CALCIUM CHLORIDE: 600; 310; 30; 20 INJECTION, SOLUTION INTRAVENOUS at 11:45

## 2022-11-21 RX ADMIN — LIDOCAINE HYDROCHLORIDE 0.5 ML: 10 INJECTION, SOLUTION EPIDURAL; INFILTRATION; INTRACAUDAL; PERINEURAL at 07:08

## 2022-11-21 RX ADMIN — MORPHINE SULFATE 2 MG: 4 INJECTION, SOLUTION INTRAMUSCULAR; INTRAVENOUS at 10:37

## 2022-11-21 RX ADMIN — FENTANYL CITRATE 50 MCG: 50 INJECTION INTRAMUSCULAR; INTRAVENOUS at 08:35

## 2022-11-21 RX ADMIN — EPHEDRINE SULFATE 10 MG: 50 INJECTION, SOLUTION INTRAVENOUS at 08:09

## 2022-11-21 RX ADMIN — CEFAZOLIN 2 G: 330 INJECTION, POWDER, FOR SOLUTION INTRAMUSCULAR; INTRAVENOUS at 07:32

## 2022-11-21 RX ADMIN — METHOCARBAMOL 1000 MG: 100 INJECTION INTRAMUSCULAR; INTRAVENOUS at 09:05

## 2022-11-21 RX ADMIN — DEXAMETHASONE SODIUM PHOSPHATE 10 MG: 4 INJECTION, SOLUTION INTRA-ARTICULAR; INTRALESIONAL; INTRAMUSCULAR; INTRAVENOUS; SOFT TISSUE at 07:32

## 2022-11-21 RX ADMIN — MORPHINE SULFATE 2 MG: 4 INJECTION, SOLUTION INTRAMUSCULAR; INTRAVENOUS at 20:30

## 2022-11-21 RX ADMIN — OXYCODONE HYDROCHLORIDE 10 MG: 5 SOLUTION ORAL at 08:45

## 2022-11-21 RX ADMIN — MIDAZOLAM HYDROCHLORIDE 2 MG: 1 INJECTION, SOLUTION INTRAMUSCULAR; INTRAVENOUS at 07:28

## 2022-11-21 RX ADMIN — FAMOTIDINE 20 MG: 20 TABLET, FILM COATED ORAL at 20:29

## 2022-11-21 RX ADMIN — LIDOCAINE HYDROCHLORIDE 80 MG: 20 INJECTION, SOLUTION EPIDURAL; INFILTRATION; INTRACAUDAL at 07:32

## 2022-11-21 RX ADMIN — FENTANYL CITRATE 50 MCG: 50 INJECTION INTRAMUSCULAR; INTRAVENOUS at 08:52

## 2022-11-21 RX ADMIN — FENTANYL CITRATE 50 MCG: 50 INJECTION INTRAMUSCULAR; INTRAVENOUS at 08:30

## 2022-11-21 RX ADMIN — FENTANYL CITRATE 50 MCG: 50 INJECTION INTRAMUSCULAR; INTRAVENOUS at 08:54

## 2022-11-21 RX ADMIN — ONDANSETRON 4 MG: 2 INJECTION INTRAMUSCULAR; INTRAVENOUS at 17:56

## 2022-11-21 RX ADMIN — HYDROCODONE BITARTRATE AND ACETAMINOPHEN 1 TABLET: 5; 325 TABLET ORAL at 18:01

## 2022-11-21 RX ADMIN — HYDROMORPHONE HYDROCHLORIDE 0.2 MG: 1 INJECTION, SOLUTION INTRAMUSCULAR; INTRAVENOUS; SUBCUTANEOUS at 09:24

## 2022-11-21 RX ADMIN — ROCURONIUM BROMIDE 80 MG: 10 INJECTION, SOLUTION INTRAVENOUS at 07:32

## 2022-11-21 ASSESSMENT — FIBROSIS 4 INDEX: FIB4 SCORE: 0.83

## 2022-11-21 ASSESSMENT — LIFESTYLE VARIABLES
HAVE PEOPLE ANNOYED YOU BY CRITICIZING YOUR DRINKING: NO
ALCOHOL_USE: YES
TOTAL SCORE: 0
EVER HAD A DRINK FIRST THING IN THE MORNING TO STEADY YOUR NERVES TO GET RID OF A HANGOVER: NO
DOES PATIENT WANT TO STOP DRINKING: NO
ON A TYPICAL DAY WHEN YOU DRINK ALCOHOL HOW MANY DRINKS DO YOU HAVE: 2
TOTAL SCORE: 0
EVER FELT BAD OR GUILTY ABOUT YOUR DRINKING: NO
HOW MANY TIMES IN THE PAST YEAR HAVE YOU HAD 5 OR MORE DRINKS IN A DAY: 0
HAVE YOU EVER FELT YOU SHOULD CUT DOWN ON YOUR DRINKING: NO
CONSUMPTION TOTAL: NEGATIVE
TOTAL SCORE: 0
AVERAGE NUMBER OF DAYS PER WEEK YOU HAVE A DRINK CONTAINING ALCOHOL: 6

## 2022-11-21 ASSESSMENT — PAIN DESCRIPTION - PAIN TYPE
TYPE: ACUTE PAIN
TYPE: SURGICAL PAIN
TYPE: SURGICAL PAIN
TYPE: ACUTE PAIN
TYPE: SURGICAL PAIN

## 2022-11-21 ASSESSMENT — PATIENT HEALTH QUESTIONNAIRE - PHQ9
2. FEELING DOWN, DEPRESSED, IRRITABLE, OR HOPELESS: NOT AT ALL
1. LITTLE INTEREST OR PLEASURE IN DOING THINGS: NOT AT ALL
SUM OF ALL RESPONSES TO PHQ9 QUESTIONS 1 AND 2: 0

## 2022-11-21 ASSESSMENT — PAIN SCALES - GENERAL: PAIN_LEVEL: 6

## 2022-11-21 NOTE — ANESTHESIA PROCEDURE NOTES
Airway    Date/Time: 11/21/2022 7:38 AM  Performed by: Darrius Collins D.O.  Authorized by: Darrius Collins D.O.     Location:  OR  Urgency:  Elective  Indications for Airway Management:  Anesthesia      Spontaneous Ventilation: absent    Sedation Level:  Deep  Preoxygenated: Yes    Patient Position:  Sniffing  Mask Difficulty Assessment:  2 - vent by mask + OA or adjuvant +/- NMBA  Final Airway Type:  Endotracheal airway  Final Endotracheal Airway:  ETT - double lumen left with ONE LUNG VENTILATION  Cuffed: Yes    Technique Used for Successful ETT Placement:  Direct laryngoscopy  Devices/Methods Used in Placement:  Bougie    Insertion Site:  Oral  Blade Type:  Lane  Laryngoscope Blade/Videolaryngoscope Blade Size:  4  ETT Double Lumen (fr):  39  Measured from:  Teeth  ETT to Teeth (cm):  30  Placement Verified by: auscultation and capnometry    Cormack-Lehane Classification:  Grade I - full view of glottis  Number of Attempts at Approach:  2  Ventilation Between Attempts:  BVM and supraglottic airway

## 2022-11-21 NOTE — PROGRESS NOTES
Patient in pre-op, assessment completed, patient and wife Maresa updated on plan of care, all questions answered, no further needs at this time, call light within reach.

## 2022-11-21 NOTE — PROGRESS NOTES
Med rec updated and complete, per pt   Allergies reviewed, per pt  Interviewed pt with wife at bedside with permission from pt.  Pt reports no prescription medications or vitamins.  Pt reports no antibiotics in the last 30 days.

## 2022-11-21 NOTE — ANESTHESIA PREPROCEDURE EVALUATION
Case: [id number] Date/Time: 11/21/22 0715    Procedures:       LEFT THORACOSCOPY, POSSIBLE BULLECTOMY, TALC PLEURODESIS (Chest)      VATS, WITH PLEURODESIS (Chest)    Anesthesia type: General    Pre-op diagnosis: PNEUMOTHORAX    Location: [facility] OR 09 / SURGERY [facility] [place]    Surgeons: John H Ganser, M.D.          Relevant Problems   Other   (positive) Former smoker   (positive) Recurrent pneumothorax       Physical Exam    Airway   Mallampati: II  TM distance: >3 FB  Neck ROM: full       Cardiovascular - normal exam  Rhythm: regular  Rate: normal  (-) murmur     Dental - normal exam           Pulmonary - normal exam  Breath sounds clear to auscultation     Abdominal    Neurological - normal exam                 Anesthesia Plan    ASA 2       Plan - general       Airway plan will be ETT          Induction: intravenous    Postoperative Plan: Postoperative administration of opioids is intended.    Pertinent diagnostic labs and testing reviewed    Informed Consent:    Anesthetic plan and risks discussed with patient.    Use of blood products discussed with: patient whom consented to blood products.

## 2022-11-21 NOTE — OP REPORT
DATE OF SERVICE:  11/21/2022     PREOPERATIVE DIAGNOSIS:  Recurrent left pneumothorax.     POSTOPERATIVE DIAGNOSIS:  Recurrent left pneumothorax secondary to bullous   disease.     PROCEDURES:  Left thoracoscopy with apical bullectomy, talc pleurodesis.     SURGEON:  John H. Ganser, MD     ASSISTANT:  Jv Anaya PA-C     ANESTHESIA:  General.     ANESTHESIOLOGIST:  Darrius Collins DO     INDICATIONS:  The patient is a 42-year-old male who has developed recurrent   left pneumothorax.  A similar problem on the right, treated previously with   apical bullectomy and talc pleurodesis.  CT scan shows scattered bullae   throughout predominantly the lower lobe.  Risks, benefits and alternatives to   the above outlined procedure were outlined in detail.  Questions answered and   he wished to proceed.     FINDINGS:  There was a dominant anterior apical bulla with adhesions   surrounding suggestive of previous collapse.  There were scattered small   bullae in the fissure and medial lower lobe, but these were not amenable to   wedge resection due to their location.     DESCRIPTION OF PROCEDURE:  The patient identified, general anesthetic   administered with a double lumen endotracheal tube.  He was placed in the   right lateral decubitus position and his left chest prepped and draped in the   usual sterile fashion.  Local anesthesia 0.5% Marcaine with epinephrine was   injected prior to skin incision.  Small incision was made midaxillary line   approximately seventh intercostal space and a 5 mm blunt trocar and camera   inserted.  Anterior 5 and 12 mm trocars were placed.  The above findings were   noted.  Adhesions to the apical bulla were taken down with cautery.  The   dominant anterior apical bullous area was elevated and resected with 2 firings   of the [place] 45 powered stapler using white loads.  This was removed and   sent for permanent histology.  Again noted were a few small bulla in the   fissure, but these  were not in position amenable to a resection easily and   therefore a talc pleurodesis was carried out to prevent any further collapse.     Four grams of sterile talc was then placed in a bulb syringe attached to a red   rubber catheter used to deliver a nice even layer of sterile talc throughout   the chest cavity by insufflation.  A 19-Polish David drain was passed through   the midaxillary line incision angled towards the apex and secured to the skin   with silk.  The other incisions were closed with Vicryl. The lung was reexpanded and appeared to expand nicely.    The other incisions were closed with Vicryl.  Sterile dressings were applied.   The tube attached to pleural suction device and the patient returned to   recovery in stable condition.     An assistant was required in this case to run the laparoscope and assisted   with retraction and closure.        ______________________________  JOHN H. GANSER, MD [facility]IN    DD:  11/21/2022 08:21  DT:  11/21/2022 08:55    Job#:  [id number]    CC:MD Phillip Johnson III, MD

## 2022-11-21 NOTE — PROGRESS NOTES
4 Eyes Skin Assessment Completed by YAN Capone and YAN Healy.    Head WDL  Ears WDL  Nose WDL  Mouth WDL  Neck WDL  Breast/Chest Abrasion to right anterior chest. Dressings x2 and chest tube to left lateral chest.   Shoulder Blades WDL  Spine WDL  (R) Arm/Elbow/Hand WDL  (L) Arm/Elbow/Hand WDL  Abdomen WDL  Groin WDL  Scrotum/Coccyx/Buttocks WDL  (R) Leg WDL  (L) Leg WDL  (R) Heel/Foot/Toe WDL  (L) Heel/Foot/Toe WDL          Devices In Places Pulse Ox, SCD's, and Nasal Cannula      Interventions In Place Pillows and Pressure Redistribution Mattress    Possible Skin Injury No    Pictures Uploaded Into Epic N/A  Wound Consult Placed N/A  RN Wound Prevention Protocol Ordered No

## 2022-11-21 NOTE — ANESTHESIA POSTPROCEDURE EVALUATION
Patient: Lyndon Sigala    Procedure Summary     Date: 11/21/22 Room / Location: [place] [address] / SURGERY [facility]    Anesthesia Start: 0728 Anesthesia Stop: 0826    Procedures:       LEFT THORACOSCOPY, BULLECTOMY, TALC PLEURODESIS (Chest)      VATS, WITH PLEURODESIS (Chest) Diagnosis: (PNEUMOTHORAX)    Surgeons: John H Ganser, M.D. Responsible Provider: Darrius Collins D.O.    Anesthesia Type: general ASA Status: 2          Final Anesthesia Type: general  Last vitals  BP   Blood Pressure: 129/80    Temp   36.4 °C (97.6 °F)    Pulse   85   Resp   19    SpO2   98 %      Anesthesia Post Evaluation    Patient location during evaluation: PACU  Patient participation: complete - patient participated  Level of consciousness: awake and alert  Pain score: 6    Airway patency: patent  Anesthetic complications: no  Cardiovascular status: hemodynamically stable  Respiratory status: acceptable  Hydration status: euvolemic    PONV: none          No notable events documented.     Nurse Pain Score: 6 (NPRS)

## 2022-11-21 NOTE — PROGRESS NOTES
Patient arrived to T426 at 1015, report received from PACU RN. Patient is AOx4, family at bedside. PRN morphine administered for complaints of pain. Assessment complete, patient on 1L O2 via NC. Left chest tube to -20 suction. Dressings x2 to left lateral chest, CD&I. Patient up to bathroom with standby assistance. Patient updated on plan of care, encouraged to notify staff for any needs/assistance. Call light within reach, frequent rounding completed.

## 2022-11-21 NOTE — PROGRESS NOTES
Patient in pre-op, assessment completed, patient updated on plan of care, all questions answered, no further needs at this time, call light within reach.

## 2022-11-21 NOTE — OR NURSING
0822: pt arrived to PACU in stable condition. VSS. Dressing to left chest is CDI. Pt resting in bed and has been updated on plan of care.     0830: pt medicated for severe pain. Pt denies nausea at this time.     0900: pt wife called and updated on pt status and room assignment. Pt states pain is improving post medication administration. VSS. Chest tube in place with small amount of drainage in tubing.     1000: Report called to Estelita HEREDIA. All pertinent pt information has been discussed. VSS. Dressing to left chest is CDI.     1013: pt transferred to room in stable condition with all belongings.

## 2022-11-21 NOTE — OR SURGEON
Immediate Post OP Note    PreOp Diagnosis: Recurrent left pneumothorax      PostOp Diagnosis: Same      Procedure(s):  LEFT THORACOSCOPY, BULLECTOMY, TALC PLEURODESIS - Wound Class: Clean Contaminated  VATS, WITH PLEURODESIS - Wound Class: Clean Contaminated    Surgeon(s):  John H Ganser, M.D.    Anesthesiologist/Type of Anesthesia:  Anesthesiologist: Darrius Collins D.O./General    Surgical Staff:  Assistant: ELIZA Garcia  Circulator: Lindsay Underwood R.N.; Christ Guallpa R.N.  Scrub Person: Lamar Barragan    Specimens removed if any:  ID Type Source Tests Collected by Time Destination   A : Left Apical Bullae  Tissue Lung PATHOLOGY SPECIMEN John H Ganser, M.D. 11/21/2022  8:02 AM        Estimated Blood Loss: 0    Findings: Apical bullae    Complications: 0        11/21/2022 8:16 AM John H Ganser, M.D.

## 2022-11-22 ENCOUNTER — PHARMACY VISIT (OUTPATIENT)
Dept: PHARMACY | Facility: MEDICAL CENTER | Age: 42
End: 2022-11-22
Payer: COMMERCIAL

## 2022-11-22 VITALS
TEMPERATURE: 97.3 F | HEART RATE: 81 BPM | WEIGHT: 171.52 LBS | SYSTOLIC BLOOD PRESSURE: 122 MMHG | DIASTOLIC BLOOD PRESSURE: 76 MMHG | BODY MASS INDEX: 23.23 KG/M2 | OXYGEN SATURATION: 93 % | HEIGHT: 72 IN | RESPIRATION RATE: 19 BRPM

## 2022-11-22 LAB
ANION GAP SERPL CALC-SCNC: 10 MMOL/L (ref 7–16)
BUN SERPL-MCNC: 12 MG/DL (ref 8–22)
CALCIUM SERPL-MCNC: 9.1 MG/DL (ref 8.5–10.5)
CHLORIDE SERPL-SCNC: 100 MMOL/L (ref 96–112)
CO2 SERPL-SCNC: 26 MMOL/L (ref 20–33)
CREAT SERPL-MCNC: 0.82 MG/DL (ref 0.5–1.4)
ERYTHROCYTE [DISTWIDTH] IN BLOOD BY AUTOMATED COUNT: 40 FL (ref 35.9–50)
GFR SERPLBLD CREATININE-BSD FMLA CKD-EPI: 112 ML/MIN/1.73 M 2
GLUCOSE SERPL-MCNC: 132 MG/DL (ref 65–99)
HCT VFR BLD AUTO: 38.5 % (ref 42–52)
HGB BLD-MCNC: 13.5 G/DL (ref 14–18)
MCH RBC QN AUTO: 31.1 PG (ref 27–33)
MCHC RBC AUTO-ENTMCNC: 35.1 G/DL (ref 33.7–35.3)
MCV RBC AUTO: 88.7 FL (ref 81.4–97.8)
PLATELET # BLD AUTO: 225 K/UL (ref 164–446)
PMV BLD AUTO: 10.2 FL (ref 9–12.9)
POTASSIUM SERPL-SCNC: 4.2 MMOL/L (ref 3.6–5.5)
RBC # BLD AUTO: 4.34 M/UL (ref 4.7–6.1)
SODIUM SERPL-SCNC: 136 MMOL/L (ref 135–145)
WBC # BLD AUTO: 10.1 K/UL (ref 4.8–10.8)

## 2022-11-22 PROCEDURE — 700111 HCHG RX REV CODE 636 W/ 250 OVERRIDE (IP): Performed by: PHYSICIAN ASSISTANT

## 2022-11-22 PROCEDURE — 90686 IIV4 VACC NO PRSV 0.5 ML IM: CPT | Performed by: SURGERY

## 2022-11-22 PROCEDURE — 85027 COMPLETE CBC AUTOMATED: CPT

## 2022-11-22 PROCEDURE — 80048 BASIC METABOLIC PNL TOTAL CA: CPT

## 2022-11-22 PROCEDURE — RXMED WILLOW AMBULATORY MEDICATION CHARGE: Performed by: PHYSICIAN ASSISTANT

## 2022-11-22 PROCEDURE — A9270 NON-COVERED ITEM OR SERVICE: HCPCS | Performed by: PHYSICIAN ASSISTANT

## 2022-11-22 PROCEDURE — 700111 HCHG RX REV CODE 636 W/ 250 OVERRIDE (IP): Performed by: SURGERY

## 2022-11-22 PROCEDURE — 90471 IMMUNIZATION ADMIN: CPT

## 2022-11-22 PROCEDURE — 700102 HCHG RX REV CODE 250 W/ 637 OVERRIDE(OP): Performed by: PHYSICIAN ASSISTANT

## 2022-11-22 RX ORDER — HYDROCODONE BITARTRATE AND ACETAMINOPHEN 5; 325 MG/1; MG/1
1 TABLET ORAL EVERY 6 HOURS PRN
Qty: 10 TABLET | Refills: 0 | Status: SHIPPED | OUTPATIENT
Start: 2022-11-22 | End: 2022-11-25

## 2022-11-22 RX ADMIN — INFLUENZA A VIRUS A/VICTORIA/2570/2019 IVR-215 (H1N1) ANTIGEN (FORMALDEHYDE INACTIVATED), INFLUENZA A VIRUS A/DARWIN/9/2021 SAN-010 (H3N2) ANTIGEN (FORMALDEHYDE INACTIVATED), INFLUENZA B VIRUS B/PHUKET/3073/2013 ANTIGEN (FORMALDEHYDE INACTIVATED), AND INFLUENZA B VIRUS B/MICHIGAN/01/2021 ANTIGEN (FORMALDEHYDE INACTIVATED) 0.5 ML: 15; 15; 15; 15 INJECTION, SUSPENSION INTRAMUSCULAR at 07:22

## 2022-11-22 RX ADMIN — FAMOTIDINE 20 MG: 20 TABLET, FILM COATED ORAL at 08:23

## 2022-11-22 RX ADMIN — ENOXAPARIN SODIUM 40 MG: 40 INJECTION SUBCUTANEOUS at 08:24

## 2022-11-22 RX ADMIN — HYDROCODONE BITARTRATE AND ACETAMINOPHEN 1 TABLET: 5; 325 TABLET ORAL at 07:25

## 2022-11-22 RX ADMIN — KETOROLAC TROMETHAMINE 30 MG: 30 INJECTION, SOLUTION INTRAMUSCULAR; INTRAVENOUS at 10:13

## 2022-11-22 ASSESSMENT — PAIN DESCRIPTION - PAIN TYPE
TYPE: ACUTE PAIN

## 2022-11-22 ASSESSMENT — ENCOUNTER SYMPTOMS
CHILLS: 0
SHORTNESS OF BREATH: 0
FEVER: 0

## 2022-11-22 NOTE — PROGRESS NOTES
Assumed care at 1845. Pt resting in bed. A&ox 4  Ambulates with SBA  Voiding adequately in the BR  Tolerating diet  Denies flatus or bm  O2 on 1LNC. desats to 84% on RA  Left CT to -20cm suction. No airleak noted  C/o rt chest pain. Medicated with norco and morphine  Call light within reach. Hourly rounding in place

## 2022-11-22 NOTE — CARE PLAN
The patient is Stable - Low risk of patient condition declining or worsening    Shift Goals  Clinical Goals: monitor chest tube; pain control; encourage ambulation  Patient Goals: pain control  Family Goals: not currently present    Progress made toward(s) clinical / shift goals:  Patient tolerated removal of chest tube. Site assessed to be clean/dry/intact. Patient has been given dressing supplies and care instructions. Patient is tolerating room air without supplemental oxygen. Patient is ambulatory and is tolerating diet. Discharge orders have been received.    Patient is not progressing towards the following goals: N/A      Problem: Pain - Standard  Goal: Alleviation of pain or a reduction in pain to the patient’s comfort goal  11/22/2022 0949 by Evy Sky RPATY.  Outcome: Met     Problem: Knowledge Deficit - Standard  Goal: Patient and family/care givers will demonstrate understanding of plan of care, disease process/condition, diagnostic tests and medications  11/22/2022 0949 by Evy Sky RSabrinaN.  Outcome: Met

## 2022-11-22 NOTE — CARE PLAN
The patient is Stable - Low risk of patient condition declining or worsening    Shift Goals  Clinical Goals: monitor chest tube; pain control; encourage ambulation  Patient Goals: pain control  Family Goals: not currently present    Progress made toward(s) clinical / shift goals:  Pain medication is being utilized PRN according to MAR parameters for patient comfort. Patient has been educated on use and purpose of incentive spirometer. Patient calls appropriately for assistance when ambulating. Chest tube remains in place and site assessed clean/dry/intact.     Patient is not progressing towards the following goals: patient has not received discharge orders at this time.    Problem: Pain - Standard  Goal: Alleviation of pain or a reduction in pain to the patient’s comfort goal  Outcome: Progressing     Problem: Knowledge Deficit - Standard  Goal: Patient and family/care givers will demonstrate understanding of plan of care, disease process/condition, diagnostic tests and medications  Outcome: Progressing

## 2022-11-22 NOTE — PROGRESS NOTES
Surgical Progress Note    Author: ELIZA Garcia Date & Time created: 2022   8:49 AM     Interval Events:  S/p Left VATS apical bullectomy, talc pleurodesis - POD#1, doing well; pain controlled; daryl po well; ambulatory; using IS; wants to go home.    Review of Systems   Constitutional:  Negative for chills and fever.   Respiratory:  Negative for shortness of breath.         + incisional/surgical pain left thorax   Skin:  Negative for itching and rash.   Hemodynamics:  Temp (24hrs), Av.6 °C (97.8 °F), Min:36.2 °C (97.1 °F), Max:36.9 °C (98.4 °F)  Temperature: 36.3 °C (97.3 °F)  Pulse  Av.4  Min: 54  Max: 91   Blood Pressure: 122/76     Respiratory:    Respiration: 19, Pulse Oximetry: 93 %     Work Of Breathing / Effort: Mild  RUL Breath Sounds: Clear, RML Breath Sounds: Diminished, RLL Breath Sounds: Diminished, SHEMAR Breath Sounds: Diminished, LLL Breath Sounds: Diminished  Neuro:  GCS       Fluids:    Intake/Output Summary (Last 24 hours) at 2022 0849  Last data filed at 2022 0730  Gross per 24 hour   Intake 1019.68 ml   Output 156 ml   Net 863.68 ml        Current Diet Order   Procedures    Diet Order Diet: Regular     Physical Exam  Vitals and nursing note reviewed.   Cardiovascular:      Rate and Rhythm: Normal rate.   Pulmonary:      Effort: Pulmonary effort is normal.      Comments: Left hest tube in place, ~ 160ml serosang output/24 hrs  No airleak seen  Wounds c/d/i   Abdominal:      Palpations: Abdomen is soft.   Skin:     General: Skin is warm and dry.   Neurological:      Mental Status: He is alert and oriented to person, place, and time.   Psychiatric:         Mood and Affect: Mood normal.     Labs:  Recent Results (from the past 24 hour(s))   CBC without Differential    Collection Time: 22  4:25 AM   Result Value Ref Range    WBC 10.1 4.8 - 10.8 K/uL    RBC 4.34 (L) 4.70 - 6.10 M/uL    Hemoglobin 13.5 (L) 14.0 - 18.0 g/dL    Hematocrit 38.5 (L) 42.0 - 52.0 %    MCV  88.7 81.4 - 97.8 fL    MCH 31.1 27.0 - 33.0 pg    MCHC 35.1 33.7 - 35.3 g/dL    RDW 40.0 35.9 - 50.0 fL    Platelet Count 225 164 - 446 K/uL    MPV 10.2 9.0 - 12.9 fL   Basic Metabolic Panel (BPM)    Collection Time: 11/22/22  4:25 AM   Result Value Ref Range    Sodium 136 135 - 145 mmol/L    Potassium 4.2 3.6 - 5.5 mmol/L    Chloride 100 96 - 112 mmol/L    Co2 26 20 - 33 mmol/L    Glucose 132 (H) 65 - 99 mg/dL    Bun 12 8 - 22 mg/dL    Creatinine 0.82 0.50 - 1.40 mg/dL    Calcium 9.1 8.5 - 10.5 mg/dL    Anion Gap 10.0 7.0 - 16.0   ESTIMATED GFR    Collection Time: 11/22/22  4:25 AM   Result Value Ref Range    GFR (CKD-EPI) 112 >60 mL/min/1.73 m 2     Medical Decision Making, by Problem:  There are no active hospital problems to display for this patient.    Plan:  1. Chest tube discontinued, Tegaderm bandage placed  2. Discharge home today when alert, comfortable, ambulatory, and tolerating PO well  3. Pt counseled re: diet , activity, wound care, I.S., and home med's  4. May shower tomorrow over Tegaderms.  5. Remove Tegaderms in 4 days.  6. No baths, hot tubs, soaks for 14 days.  7. No lifting >15 lbs for 1-2 wks  8. No driving for 4-5 days   9. F/U with Dr. Ganser in 1-2 weeks    CALL IF YOU: (1) fevers greater than 101.0 degrees F;  (2) Unusual chest or leg pain, redness or swelling behind the knee or in the calf muscle;  (3) Drainage or fluid from incision that may be foul smelling, increased tenderness or soreness at the wound or the wound edges are no longer together, redness or swelling at the incision site.  (4) Please do not hesitate to call with any other questions. ([phone]).     Quality Measures:  Quality-Core Measures   Reviewed items::  Labs reviewed, Medications reviewed and Radiology images reviewed  Talbot catheter::  No Talbot  DVT prophylaxis pharmacological::  Enoxaparin (Lovenox)  DVT prophylaxis - mechanical:  SCDs  Ulcer Prophylaxis::  Yes    Discussed patient condition with Family, RN,  Patient, and Dr. Ganser

## 2022-11-22 NOTE — PROGRESS NOTES
Patient has been called back  Explained that he is able to work light duty  If the job cannot provide light duty work, they would be the ones removing the patient from work  Patient verbalized understanding  Report received from Lashae HEREDIA and care of patient assumed at 0645.

## 2022-11-22 NOTE — CARE PLAN
The patient is Stable - Low risk of patient condition declining or worsening    Shift Goals  Clinical Goals: Pain control, IS, early mobilization    Progress made toward(s) clinical / shift goals: PRN medications administered for complaints of pain. Patient reports reduction in pain post medication administration. Patient pulling 1500 on IS. Patient ambulating to bathroom with standby assist. Patient encouraged to ambulate in hallway, states he will try later this evening.

## 2022-11-22 NOTE — DISCHARGE INSTRUCTIONS
1. Chest tube discontinued, Tegaderm bandage placed  2. Discharge home today when alert, comfortable, ambulatory, and tolerating PO well  3. Pt counseled re: diet , activity, wound care, I.S., and home med's  4. May shower tomorrow over Tegaderms.  5. Remove Tegaderms in 4 days.  6. No baths, hot tubs, soaks for 14 days.  7. No lifting >15 lbs for 1-2 wks  8. No driving for 4-5 days   9. F/U with Dr. Ganser in 1-2 weeks    CALL IF YOU: (1) fevers greater than 101.0 degrees F;  (2) Unusual chest or leg pain, redness or swelling behind the knee or in the calf muscle;  (3) Drainage or fluid from incision that may be foul smelling, increased tenderness or soreness at the wound or the wound edges are no longer together, redness or swelling at the incision site.  (4) Please do not hesitate to call with any other questions. ([phone]).       Discharge Instructions    Discharged to home by car with relative. Discharged via wheelchair, hospital escort: Yes.  Special equipment needed: Not Applicable    Be sure to schedule a follow-up appointment with your primary care doctor or any specialists as instructed.     Discharge Plan:   Diet Plan: Discussed  Activity Level: Discussed  Confirmed Symptoms Management: Discussed  Medication Reconciliation Updated: Yes  Influenza Vaccine Indication: Indicated: 9 to 64 years of age  Influenza Vaccine Given - only chart on this line when given: Influenza Vaccine Given (See MAR)    I understand that a diet low in cholesterol, fat, and sodium is recommended for good health. Unless I have been given specific instructions below for another diet, I accept this instruction as my diet prescription.   Other diet: reg    Special Instructions: None    -Is this patient being discharged with medication to prevent blood clots?  No    Is patient discharged on Warfarin / Coumadin?   No     Pneumothorax  You have a pneumothorax. This means that you have a partial collapse of one of your lungs.  This  condition may occur spontaneously, or may develop from a chest injury. People with a spontaneous pneumothorax can have a problem with repeated episodes. A catheter (chest tube) may be inserted between your ribs. This slowly removes the air from around the lung over a few days. Hospital care will likely be needed if you have a chest tube. Hospital care may be needed if your condition worsens, if the lung does not expand fully, or if you have other significant injuries like fractured ribs.  Your condition does not appear to require hospital care at this time. Rest as much as possible and avoid any strenuous activity until the lung is normal and your doctor approves. Do not smoke or drink alcohol. Call your doctor or go to the emergency room at once if you develop increased chest pain, more shortness of breath, pain on both sides of the chest, or a fever.  Document Released: 01/25/2006 Document Revised: 03/11/2013 Document Reviewed: 02/15/2010  "[employer]® Patient Information ©2013 "[employer].

## 2022-11-22 NOTE — CARE PLAN
The patient is Stable - Low risk of patient condition declining or worsening    Shift Goals  Clinical Goals: mobilize    Progress made toward(s) clinical / shift goals:  pt ambulating in the room with SBA.     Patient is not progressing towards the following goals:    Problem: Pain - Standard  Goal: Alleviation of pain or a reduction in pain to the patient’s comfort goal  Outcome: Progressing     Problem: Knowledge Deficit - Standard  Goal: Patient and family/care givers will demonstrate understanding of plan of care, disease process/condition, diagnostic tests and medications  Outcome: Progressing

## 2022-11-22 NOTE — PROGRESS NOTES
Meds to beds have been delivered. Patient is dressed and has belongings packed. Discharge lounge orders initiated.

## 2022-12-15 ENCOUNTER — OFFICE VISIT (OUTPATIENT)
Dept: SLEEP MEDICINE | Facility: MEDICAL CENTER | Age: 42
End: 2022-12-15
Payer: COMMERCIAL

## 2022-12-15 VITALS
BODY MASS INDEX: 22.75 KG/M2 | HEART RATE: 88 BPM | WEIGHT: 168 LBS | OXYGEN SATURATION: 95 % | HEIGHT: 72 IN | DIASTOLIC BLOOD PRESSURE: 74 MMHG | SYSTOLIC BLOOD PRESSURE: 122 MMHG

## 2022-12-15 DIAGNOSIS — J93.9 RECURRENT PNEUMOTHORAX: ICD-10-CM

## 2022-12-15 PROCEDURE — 99214 OFFICE O/P EST MOD 30 MIN: CPT | Performed by: INTERNAL MEDICINE

## 2022-12-15 ASSESSMENT — ENCOUNTER SYMPTOMS
SHORTNESS OF BREATH: 0
HEMOPTYSIS: 0
WHEEZING: 0

## 2022-12-15 ASSESSMENT — FIBROSIS 4 INDEX: FIB4 SCORE: 0.92

## 2022-12-15 NOTE — PROGRESS NOTES
"Pulmonary Clinic Note    Chief Complaint:  Chief Complaint   Patient presents with    Follow-Up     Recurrent pneumothorax. Last seen 09/20/22    Results     CXR  11/21/22, CT-Chest 09/30/22     HPI:   Lyndon Sigala is a very pleasant 42 y.o. male former tobacco smoker 10 pack years quit 2012 referred to the pulmonary clinic for recurrent pneumothoraces.  Since he was 18 years old he reports sensation of a \"popping\" in his chest which would come and go, migrate to various areas of his chest and back.  They would self resolve after couple weeks.  Never obtained any imaging or required hospitalization for these issues. They were associated with significant pain for which he actually had to undergo chiropractic care and was on disability from work for a period of time.       In 2020 he was hospitalized for a right pneumothorax, and underwent a right VATS blebectomy and mechanical/doxycycline pleurodesis by Dr. Desai.  He has been stable since however had a similar episode/pain sensation several weeks ago, was seen in urgent care and was found to have a spontaneous left pneumothorax.  No trauma history.  He was followed up in the ER a week or 2 later and showed persistent left pneumothorax.  CT chest from his hospitalization in 2020 shows basilar blebs in the left lung.  He has been stable, his pain has been manageable, no supplemental oxygen requirements.     Interestingly, he does swim recreationally where he does long breath-hold maneuvers.  No diving/scuba diving     He is a tall thin man so fits the demographic profile for spontaneous pneumothoraces.  He denies any joint laxity or dislocations to suggest Marfan's or Gayla-Danlos.  No pectus excavatum on exam.  No family history of spontaneous pneumothoraces, spontaneous aortic aneurysms, or lymphangioleiomyomatosis/TSC.    Interval events since last visit in 9/2022:  Underwent L apical bullectomy and talc pleurodesis in 11/2022, Dr. Ganser  Recovering well from " surgery  Switched to THC edibles    Past Medical History:   Diagnosis Date    Chickenpox     History of pneumothorax 2022    left side    History of pneumothorax     right    Wears glasses        Past Surgical History:   Procedure Laterality Date    PA THORACOSCOPY,DX NO BX  2022    Procedure: LEFT THORACOSCOPY, BULLECTOMY, TALC PLEURODESIS;  Surgeon: John H Ganser, M.D.;  Location: SURGERY [facility];  Service: General    PA THORACOSCOPY SURG W/PLEURODESIS  2022    Procedure: VATS, WITH PLEURODESIS;  Surgeon: John H Ganser, M.D.;  Location: SURGERY [facility];  Service: General    PA THORACOSCOPY,DX NO BX Right 2020    Procedure: THORACOSCOPY, BLEBECTOMY, CHEMICAL PLEUREDESIS;  Surgeon: Brian Desai M.D.;  Location: SURGERY [facility];  Service: General       Social History     Socioeconomic History    Marital status:      Spouse name: Not on file    Number of children: Not on file    Years of education: Not on file    Highest education level: Not on file   Occupational History    Not on file   Tobacco Use    Smoking status: Former     Packs/day: 1.00     Years: 15.00     Pack years: 15.00     Types: Cigarettes     Quit date: 2008     Years since quittin.0    Smokeless tobacco: Never   Vaping Use    Vaping Use: Never used   Substance and Sexual Activity    Alcohol use: Yes     Alcohol/week: 1.8 - 3.0 oz     Types: 3 - 5 Cans of beer per week     Comment: 3-6 a week    Drug use: Not Currently     Types: Inhaled     Comment: marijuana, smoke, last use     Sexual activity: Not on file   Other Topics Concern    Not on file   Social History Narrative    Not on file     Social Determinants of Health     Financial Resource Strain: Not on file   Food Insecurity: Not on file   Transportation Needs: Not on file   Physical Activity: Not on file   Stress: Not on file   Social Connections: Not on file   Intimate Partner Violence: Not on file   Housing Stability: Not on file           Family History   Problem Relation Age of Onset    Arterial Aneurysm Maternal Grandmother     Diabetes Paternal Grandmother        Current Outpatient Medications on File Prior to Visit   Medication Sig Dispense Refill    naproxen (ANAPROX) 220 MG tablet Take 1 Tablet by mouth 2 times a day as needed. Indications: Pain       No current facility-administered medications on file prior to visit.       Allergies: Patient has no known allergies.      ROS:   Review of Systems   Respiratory:  Negative for hemoptysis, shortness of breath and wheezing.      Vitals:  /74 (BP Location: Left arm, Patient Position: Sitting, BP Cuff Size: Adult)   Pulse 88   Ht 1.829 m (6')   Wt 76.2 kg (168 lb)   SpO2 95%     Physical Exam:  Physical Exam  Constitutional:       General: He is not in acute distress.     Appearance: Normal appearance. He is well-developed. He is not diaphoretic.      Comments: Very pleasant   HENT:      Nose: Nose normal.      Mouth/Throat:      Pharynx: No oropharyngeal exudate.   Eyes:      General: No scleral icterus.        Right eye: No discharge.         Left eye: No discharge.      Conjunctiva/sclera: Conjunctivae normal.      Pupils: Pupils are equal, round, and reactive to light.   Neck:      Thyroid: No thyromegaly.      Vascular: No JVD.      Trachea: No tracheal deviation.   Cardiovascular:      Rate and Rhythm: Normal rate and regular rhythm.      Heart sounds: Normal heart sounds. No murmur heard.  Pulmonary:      Effort: Pulmonary effort is normal. No respiratory distress.      Breath sounds: Normal breath sounds. No stridor. No wheezing or rales.   Abdominal:      General: There is no distension.      Palpations: Abdomen is soft.      Tenderness: There is no abdominal tenderness. There is no guarding.   Musculoskeletal:         General: No tenderness. Normal range of motion.      Cervical back: Neck supple.      Comments: No pectus excavatum   Lymphadenopathy:      Cervical: No  cervical adenopathy.   Skin:     General: Skin is warm and dry.      Capillary Refill: Capillary refill takes less than 2 seconds.      Coloration: Skin is not pale.      Findings: No erythema.   Neurological:      Mental Status: He is alert and oriented to person, place, and time.      Sensory: No sensory deficit.      Motor: No abnormal muscle tone.      Coordination: Coordination normal.      Deep Tendon Reflexes: Reflexes normal.   Psychiatric:         Behavior: Behavior normal.         Thought Content: Thought content normal.         Judgment: Judgment normal.       Laboratory Data:    PFTs as reviewed by me personally show:  None for review at time of consultation    Imaging as reviewed by me personally show:    See HPI    Assessment/Plan:    Problem List Items Addressed This Visit       Recurrent pneumothorax     Spontaneous nontraumatic pneumothorax on the right side in 2020, underwent R blebectomy and mechanical/doxycycline pleurodesis. His CT at that time showed basilar blebs bilaterally. Now s/p L apical bullectomy and talc pleurodesis in 11/2022    Denies any joint laxity or dislocations to suggest Marfan's or Gayla-Danlos.  No pectus excavatum on exam.  No family history of spontaneous pneumothoraces, spontaneous aortic aneurysms, or lymphangioleiomyomatosis/TSC.    -- Check CTD panel, Sjogren's, HIV  -- Monitor annually for bleb development postoperatively, if ongoing will need workup for ED/Marfans/YOUNG. Pursue serologic studies with VEGF-D +/- CT abdomen/pelvis to rule out TSC/YOUNG. Consider bronchoscopy/BAL and transbronchial biopsy to look for CD1a positive Langerhans like cells  -- Repeat CT chest 12/2023         Relevant Orders    CONNECTIVE TISSUE DISEASES PROFILE    SJOGREN'S AB, ANTI-SS-A/-SS-B    HIV AG/AB COMBO ASSAY SCREENING    CT-CHEST (THORAX) W/O     Return in about 1 year (around 12/15/2023).     This note was generated using voice recognition software which has a chance of producing  errors of grammar and possibly content.  I have made every reasonable attempt to find and correct any obvious errors, but it should be expected that some may not be found prior to finalization of this note.    Time spent in record review prior to patient arrival, reviewing results, and in face-to-face encounter totaled 36 min, excluding any procedures if performed.  __________  Francisco Javier Schultz MD  Pulmonary and Critical Care Medicine  [facility]

## 2022-12-16 NOTE — ASSESSMENT & PLAN NOTE
Spontaneous nontraumatic pneumothorax on the right side in 2020, underwent R blebectomy and mechanical/doxycycline pleurodesis. His CT at that time showed basilar blebs bilaterally. Now s/p L apical bullectomy and talc pleurodesis in 11/2022    Denies any joint laxity or dislocations to suggest Marfan's or Gayla-Danlos.  No pectus excavatum on exam.  No family history of spontaneous pneumothoraces, spontaneous aortic aneurysms, or lymphangioleiomyomatosis/TSC.    -- Check CTD panel, Sjogren's, HIV  -- Monitor annually for bleb development postoperatively, if ongoing will need workup for ED/Marfans/YOUNG. Pursue serologic studies with VEGF-D +/- CT abdomen/pelvis to rule out TSC/YOUNG. Consider bronchoscopy/BAL and transbronchial biopsy to look for CD1a positive Langerhans like cells  -- Repeat CT chest 12/2023

## 2023-03-03 ENCOUNTER — HOSPITAL ENCOUNTER (EMERGENCY)
Facility: MEDICAL CENTER | Age: 43
End: 2023-03-03
Attending: EMERGENCY MEDICINE
Payer: COMMERCIAL

## 2023-03-03 VITALS
WEIGHT: 171.52 LBS | OXYGEN SATURATION: 94 % | DIASTOLIC BLOOD PRESSURE: 86 MMHG | HEART RATE: 68 BPM | HEIGHT: 72 IN | TEMPERATURE: 98.2 F | BODY MASS INDEX: 23.23 KG/M2 | RESPIRATION RATE: 21 BRPM | SYSTOLIC BLOOD PRESSURE: 129 MMHG

## 2023-03-03 DIAGNOSIS — R03.0 PRE-HYPERTENSION: ICD-10-CM

## 2023-03-03 LAB — EKG IMPRESSION: NORMAL

## 2023-03-03 PROCEDURE — 93005 ELECTROCARDIOGRAM TRACING: CPT | Performed by: EMERGENCY MEDICINE

## 2023-03-03 PROCEDURE — 99283 EMERGENCY DEPT VISIT LOW MDM: CPT

## 2023-03-03 ASSESSMENT — FIBROSIS 4 INDEX: FIB4 SCORE: 0.95

## 2023-03-03 NOTE — DISCHARGE INSTRUCTIONS
Check of your blood pressures at home to show to your primary care physician.  Return to the emergency department for severely elevated blood pressure chest pain or worsening symptoms.

## 2023-03-03 NOTE — ED TRIAGE NOTES
Chief Complaint   Patient presents with    Hypertension     Pt states he was told his BP was high yesterday at the dentist. Pt became anxious because he's been having pressure in his head ever since.        BP (!) 146/93   Pulse (!) 120   Temp 37 °C (98.6 °F) (Temporal)   Resp 18   Ht 1.829 m (6')   Wt 77.8 kg (171 lb 8.3 oz)   SpO2 97%   BMI 23.26 kg/m²

## 2023-03-03 NOTE — ED NOTES
Received orders for DC. VS obtained and are stable. Pt left prior to receiving paperwork. ERP states that she had already arrange f/u with pt.

## 2023-03-03 NOTE — ED PROVIDER NOTES
ED Provider Note    CHIEF COMPLAINT  Chief Complaint   Patient presents with    Hypertension     Pt states he was told his BP was high yesterday at the dentist. Pt became anxious because he's been having pressure in his head ever since.        EXTERNAL RECORDS REVIEWED  Outpatient Notes the patient's last office visit with his primary care physician was 12/15/2022 for follow-up for pneumonia and recurrent pneumothorax    HPI/ROS  LIMITATION TO HISTORY   Select: : None  OUTSIDE HISTORIAN(S):  Significant other: Wife who wonders if the pressure in his head is due to elevated blood pressure    Lyndon Sigala is a 43 y.o. male who presents complaining that his dentist told him he had high blood pressure yesterday and he has been anxious about it ever since.  The patient states that since he was told this at the dentist he has had intermittent pain in his chest and was unable to sleep all night.  He also is complaining of some pressure in his head.  He denies any extremity weakness or numbness.  He denies any shortness of breath.  He does have a history of tobacco use and quit in 2012 to having recurrent continuous pneumothoracies.  The patient states that his blood pressure at the dentist was 120/88.  The patient denies any drug use.  He has not smoked since 2012.  He does have occasional alcohol use.  He has not noticed any swelling in his legs or shortness of breath with laying flat.  He denies any fevers chills sore throat cough or cold symptoms.    PAST MEDICAL HISTORY   has a past medical history of Chickenpox, History of pneumothorax (08/2022), History of pneumothorax (2020), and Wears glasses.    SURGICAL HISTORY   has a past surgical history that includes thoracoscopy,dx no bx (Right, 9/12/2020); thoracoscopy,dx no bx (11/21/2022); and thoracoscopy surg w/pleurodesis (11/21/2022).    FAMILY HISTORY  Family History   Problem Relation Age of Onset    Arterial Aneurysm Maternal Grandmother     Diabetes  Paternal Grandmother        SOCIAL HISTORY  Social History     Tobacco Use    Smoking status: Former     Packs/day: 1.00     Years: 15.00     Pack years: 15.00     Types: Cigarettes     Quit date: 2008     Years since quittin.2    Smokeless tobacco: Never   Vaping Use    Vaping Use: Never used   Substance and Sexual Activity    Alcohol use: Yes     Alcohol/week: 1.8 - 3.0 oz     Types: 3 - 5 Cans of beer per week     Comment: 3-6 a week    Drug use: Not Currently     Types: Inhaled     Comment: marijuana, smoke    Sexual activity: Not on file       CURRENT MEDICATIONS  Home Medications       Reviewed by Sade Pugh R.N. (Registered Nurse) on 23 at 1255  Med List Status: Not Addressed     Medication Last Dose Status   naproxen (ANAPROX) 220 MG tablet  Active                    ALLERGIES  No Known Allergies    PHYSICAL EXAM  VITAL SIGNS: BP (!) 146/93   Pulse (!) 120   Temp 37 °C (98.6 °F) (Temporal)   Resp 18   Ht 1.829 m (6')   Wt 77.8 kg (171 lb 8.3 oz)   SpO2 97%   BMI 23.26 kg/m²    Constitutional: Well developed, Well nourished, No acute distress, Non-toxic appearance.   HEENT: Normocephalic, Atraumatic,  external ears normal, pharynx pink,  Mucous  Membranes moist, No rhinorrhea or mucosal edema  Eyes: PERRL, EOMI, Conjunctiva normal, No discharge.   Neck: Normal range of motion, No tenderness, Supple, No stridor.   Lymphatic: No lymphadenopathy    Cardiovascular: Regular Rate and Rhythm, No murmurs,  rubs, or gallops.   Thorax & Lungs: Lungs clear to auscultation bilaterally, No respiratory distress, No wheezes, rhales or rhonchi, No chest wall tenderness.   Abdomen: Bowel sounds normal, Soft, non tender, non distended,  No pulsatile masses., no rebound guarding or peritoneal signs.   Skin: Warm, Dry, No erythema, No rash,   Back:  No CVA tenderness,  No spinal tenderness, bony crepitance step offs or instability.   Extremities: Equal, intact distal pulses, No cyanosis, clubbing  or edema,  No tenderness.   Musculoskeletal: Good range of motion in all major joints. No tenderness to palpation or major deformities noted.   Neurologic: Alert & oriented x 3,  Equal strength and sensation upper and lower extremities bilaterally,  No focal deficits noted.   Psychiatric: Affect normal, Judgment normal, Mood normal.    DIAGNOSTIC STUDIES / PROCEDURES  EKG  I have independently interpreted this EKG  See below    LABS  Results for orders placed or performed during the hospital encounter of 23   EKG   Result Value Ref Range    Report       [facility] Emergency Dept.    Test Date:  2023  Pt Name:    SUSHIL WRAY                 Department: ER  MRN:        [id number]                      Room:       [facility]  Gender:     Male                         Technician: [id number]  :        1980                   Requested By:LIA OLIVEIRA  Order #:    [id number]                    Reading MD: LIA OLIVEIRA MD    Measurements  Intervals                                Axis  Rate:       63                           P:          50  NE:         137                          QRS:        78  QRSD:       95                           T:          58  QT:         379  QTc:        388    Interpretive Statements  Sinus rhythm  RSR' in V1 or V2, probably normal variant  Compared to ECG 09/15/2022 09:26:33  Right ventricular hypertrophy no longer present  Electronically Signed On 3-3-2023 14:00:04 PST by LIA OLIVEIRA MD          RADIOLOGY  I have independently interpreted the diagnostic imaging associated with this visit and am waiting the final reading from the radiologist.   My preliminary interpretation is as follows: None  Radiologist interpretation: None    COURSE & MEDICAL DECISION MAKING    ED Observation Status? No; Patient does not meet criteria for ED Observation.     INITIAL ASSESSMENT, COURSE AND PLAN  Care Narrative: This is a 43-year-old male with a history of previous spontaneous  josh who was told at the dentist yesterday that his blood pressure was elevated at 120/88.  Since then he has been very anxious about his blood pressure and wants to know if he requires medications for this.  He also has noticed some intermittent chest pains and pain in the back of his head.  He denies any neurological deficits.  His physical exam is unremarkable and his blood pressure here is 129/86 with a heart rate that was initially elevated at 120 most likely due to anxiety but is now down to 68.  The patient's EKG does not show any evidence of ischemia.  His physical exam is normal.  I explained to him that this time his blood pressure is not high enough to treat with medications.  I advised him to keep track of his blood pressure at home and follow-up with his primary care doctor for further evaluation.  I told him that prehypertension (i.e. blood pressure greater than 120 systolic and greater than 80 diastolic) be used as an opportunity to make some improvements in his life habits to help prevent actual hypertension from forming.These lifestyle changes include exercise, decreasing caffeine and salt use and eating more vegetables.  At this time I advised the patient to return to the emergency department at any time should he have severely elevated blood pressure in the 180s over 100s severe headache chest pain shortness of breath or worsening symptoms and otherwise keep track of his blood pressure at home and follow-up with his primary care physician.  Patient will be discharged home in stable condition      /86   Pulse 68   Temp 36.8 °C (98.2 °F) (Temporal)   Resp (!) 21   Ht 1.829 m (6')   Wt 77.8 kg (171 lb 8.3 oz)   SpO2 94%   BMI 23.26 kg/m²     ADDITIONAL PROBLEM LIST    DISPOSITION AND DISCUSSIONS      I have discussed management of the patient with the following physicians and JANNIE's:  none    Discussion of management with other QHP or appropriate source(s): None     Escalation of  care considered, and ultimately not performed:blood analysis and diagnostic imaging were considered however the patient's EKG and physical exam are normal and his blood pressure is not high enough to warrant further inpatient emergent work-up    Barriers to care at this time, including but not limited to:  None .     Decision tools and prescription drugs considered including, but not limited to: NIH Stroke Scale 0 and HEART Score 0 .    The patient will return for new or worsening symptoms and is stable at the time of discharge.    The patient is referred to a primary physician for blood pressure management, diabetic screening, and for all other preventative health concerns.      DISPOSITION:  Patient will be discharged home in stable condition.    FOLLOW UP:  Phillip Avery M.D.  [address] [place] [address]   [address] [id number] [phone]    Call in 3 days  for recheck      OUTPATIENT MEDICATIONS:  Discharge Medication List as of 3/3/2023  2:39 PM            FINAL DIAGNOSIS  1. Pre-hypertension           Electronically signed by: Etta Mota M.D., 3/3/2023 1:34 PM

## 2023-07-24 ENCOUNTER — HOSPITAL ENCOUNTER (OUTPATIENT)
Dept: LAB | Facility: MEDICAL CENTER | Age: 43
End: 2023-07-24
Attending: INTERNAL MEDICINE
Payer: COMMERCIAL

## 2023-07-24 DIAGNOSIS — J93.9 RECURRENT PNEUMOTHORAX: ICD-10-CM

## 2023-07-24 LAB — HIV 1+2 AB+HIV1 P24 AG SERPL QL IA: NORMAL

## 2023-07-24 PROCEDURE — 36415 COLL VENOUS BLD VENIPUNCTURE: CPT

## 2023-07-24 PROCEDURE — 86235 NUCLEAR ANTIGEN ANTIBODY: CPT | Mod: 91

## 2023-07-24 PROCEDURE — 87389 HIV-1 AG W/HIV-1&-2 AB AG IA: CPT

## 2023-07-24 PROCEDURE — 83516 IMMUNOASSAY NONANTIBODY: CPT

## 2023-07-26 LAB
CENTROMERE IGG TITR SER IF: 1 AU/ML (ref 0–40)
ENA JO1 AB TITR SER: 2 AU/ML (ref 0–40)
ENA SCL70 IGG SER QL: 5 AU/ML (ref 0–40)
ENA SM IGG SER-ACNC: 7 AU/ML (ref 0–40)
ENA SS-B IGG SER IA-ACNC: 1 AU/ML (ref 0–40)
RIBOSOMAL P AB SER-ACNC: 7 AU/ML (ref 0–40)
SSA52 R0ENA AB IGG Q0420: 7 AU/ML (ref 0–40)
SSA60 R0ENA AB IGG Q0419: 9 AU/ML (ref 0–40)
U1 SNRNP IGG SER QL: 3 UNITS (ref 0–19)

## 2023-09-01 ENCOUNTER — APPOINTMENT (RX ONLY)
Dept: URBAN - METROPOLITAN AREA CLINIC 20 | Facility: CLINIC | Age: 43
Setting detail: DERMATOLOGY
End: 2023-09-01

## 2023-09-01 DIAGNOSIS — L57.0 ACTINIC KERATOSIS: ICD-10-CM | Status: INADEQUATELY CONTROLLED

## 2023-09-01 DIAGNOSIS — D18.0 HEMANGIOMA: ICD-10-CM

## 2023-09-01 DIAGNOSIS — L57.8 OTHER SKIN CHANGES DUE TO CHRONIC EXPOSURE TO NONIONIZING RADIATION: ICD-10-CM

## 2023-09-01 DIAGNOSIS — D22 MELANOCYTIC NEVI: ICD-10-CM

## 2023-09-01 DIAGNOSIS — L82.1 OTHER SEBORRHEIC KERATOSIS: ICD-10-CM

## 2023-09-01 DIAGNOSIS — L81.4 OTHER MELANIN HYPERPIGMENTATION: ICD-10-CM

## 2023-09-01 PROBLEM — D22.5 MELANOCYTIC NEVI OF TRUNK: Status: ACTIVE | Noted: 2023-09-01

## 2023-09-01 PROBLEM — D18.01 HEMANGIOMA OF SKIN AND SUBCUTANEOUS TISSUE: Status: ACTIVE | Noted: 2023-09-01

## 2023-09-01 PROCEDURE — ? LIQUID NITROGEN

## 2023-09-01 PROCEDURE — 17000 DESTRUCT PREMALG LESION: CPT

## 2023-09-01 PROCEDURE — ? COUNSELING

## 2023-09-01 PROCEDURE — 99213 OFFICE O/P EST LOW 20 MIN: CPT | Mod: 25

## 2023-09-01 ASSESSMENT — LOCATION DETAILED DESCRIPTION DERM
LOCATION DETAILED: LEFT ANTERIOR PROXIMAL UPPER ARM
LOCATION DETAILED: RIGHT CENTRAL MALAR CHEEK
LOCATION DETAILED: LEFT INFERIOR CENTRAL MALAR CHEEK
LOCATION DETAILED: LEFT ANTERIOR DISTAL THIGH
LOCATION DETAILED: LEFT DISTAL DORSAL FOREARM
LOCATION DETAILED: RIGHT MID-UPPER BACK
LOCATION DETAILED: RIGHT ANTERIOR DISTAL THIGH
LOCATION DETAILED: LEFT MEDIAL SUPERIOR CHEST
LOCATION DETAILED: NASAL DORSUM
LOCATION DETAILED: RIGHT ANTERIOR PROXIMAL UPPER ARM
LOCATION DETAILED: RIGHT INFERIOR UPPER BACK
LOCATION DETAILED: RIGHT DISTAL DORSAL FOREARM
LOCATION DETAILED: RIGHT SUPERIOR MEDIAL UPPER BACK

## 2023-09-01 ASSESSMENT — LOCATION SIMPLE DESCRIPTION DERM
LOCATION SIMPLE: LEFT CHEEK
LOCATION SIMPLE: RIGHT UPPER ARM
LOCATION SIMPLE: LEFT THIGH
LOCATION SIMPLE: NOSE
LOCATION SIMPLE: RIGHT THIGH
LOCATION SIMPLE: CHEST
LOCATION SIMPLE: RIGHT CHEEK
LOCATION SIMPLE: RIGHT UPPER BACK
LOCATION SIMPLE: LEFT FOREARM
LOCATION SIMPLE: RIGHT FOREARM
LOCATION SIMPLE: LEFT UPPER ARM

## 2023-09-01 ASSESSMENT — LOCATION ZONE DERM
LOCATION ZONE: LEG
LOCATION ZONE: FACE
LOCATION ZONE: NOSE
LOCATION ZONE: ARM
LOCATION ZONE: TRUNK

## 2023-09-01 NOTE — PROCEDURE: LIQUID NITROGEN
Post-Care Instructions: I reviewed with the patient in detail post-care instructions. Patient is to wear sunprotection, and avoid picking at any of the treated lesions. Pt may apply Vaseline to crusted or scabbing areas.
Render Post-Care Instructions In Note?: yes
Number Of Freeze-Thaw Cycles: 2 freeze-thaw cycles
Detail Level: Detailed
Render Note In Bullet Format When Appropriate: No
Duration Of Freeze Thaw-Cycle (Seconds): 10
Consent: The patient's consent was obtained including but not limited to risks of crusting, scabbing, blistering, scarring, darker or lighter pigmentary change, recurrence, incomplete removal and infection. RTC in 2 months if lesion(s) persistent.

## 2023-09-13 ENCOUNTER — HOSPITAL ENCOUNTER (OUTPATIENT)
Dept: LAB | Facility: MEDICAL CENTER | Age: 43
End: 2023-09-13
Attending: NURSE PRACTITIONER
Payer: COMMERCIAL

## 2023-09-13 LAB
ALBUMIN SERPL BCP-MCNC: 4.7 G/DL (ref 3.2–4.9)
ALBUMIN/GLOB SERPL: 1.7 G/DL
ALP SERPL-CCNC: 64 U/L (ref 30–99)
ALT SERPL-CCNC: 17 U/L (ref 2–50)
ANION GAP SERPL CALC-SCNC: 9 MMOL/L (ref 7–16)
AST SERPL-CCNC: 20 U/L (ref 12–45)
BILIRUB SERPL-MCNC: 0.7 MG/DL (ref 0.1–1.5)
BUN SERPL-MCNC: 15 MG/DL (ref 8–22)
CALCIUM ALBUM COR SERPL-MCNC: 8.9 MG/DL (ref 8.5–10.5)
CALCIUM SERPL-MCNC: 9.5 MG/DL (ref 8.5–10.5)
CHLORIDE SERPL-SCNC: 105 MMOL/L (ref 96–112)
CHOLEST SERPL-MCNC: 218 MG/DL (ref 100–199)
CO2 SERPL-SCNC: 28 MMOL/L (ref 20–33)
CREAT SERPL-MCNC: 1.02 MG/DL (ref 0.5–1.4)
EST. AVERAGE GLUCOSE BLD GHB EST-MCNC: 108 MG/DL
FASTING STATUS PATIENT QL REPORTED: NORMAL
GFR SERPLBLD CREATININE-BSD FMLA CKD-EPI: 93 ML/MIN/1.73 M 2
GLOBULIN SER CALC-MCNC: 2.8 G/DL (ref 1.9–3.5)
GLUCOSE SERPL-MCNC: 106 MG/DL (ref 65–99)
HBA1C MFR BLD: 5.4 % (ref 4–5.6)
HDLC SERPL-MCNC: 41 MG/DL
LDLC SERPL CALC-MCNC: 147 MG/DL
POTASSIUM SERPL-SCNC: 4.3 MMOL/L (ref 3.6–5.5)
PROT SERPL-MCNC: 7.5 G/DL (ref 6–8.2)
SODIUM SERPL-SCNC: 142 MMOL/L (ref 135–145)
T4 FREE SERPL-MCNC: 1.34 NG/DL (ref 0.93–1.7)
TRIGL SERPL-MCNC: 148 MG/DL (ref 0–149)
TSH SERPL DL<=0.005 MIU/L-ACNC: 1.59 UIU/ML (ref 0.38–5.33)

## 2023-09-13 PROCEDURE — 83036 HEMOGLOBIN GLYCOSYLATED A1C: CPT

## 2023-09-13 PROCEDURE — 84443 ASSAY THYROID STIM HORMONE: CPT

## 2023-09-13 PROCEDURE — 80053 COMPREHEN METABOLIC PANEL: CPT

## 2023-09-13 PROCEDURE — 84154 ASSAY OF PSA FREE: CPT

## 2023-09-13 PROCEDURE — 80061 LIPID PANEL: CPT

## 2023-09-13 PROCEDURE — 36415 COLL VENOUS BLD VENIPUNCTURE: CPT

## 2023-09-13 PROCEDURE — 84439 ASSAY OF FREE THYROXINE: CPT

## 2023-09-13 PROCEDURE — 84153 ASSAY OF PSA TOTAL: CPT

## 2023-09-15 LAB
PSA FREE MFR SERPL: 33 %
PSA FREE SERPL-MCNC: 0.3 NG/ML
PSA SERPL-MCNC: 0.9 NG/ML (ref 0–4)

## 2023-10-27 ENCOUNTER — HOSPITAL ENCOUNTER (OUTPATIENT)
Facility: MEDICAL CENTER | Age: 43
End: 2023-10-27
Attending: FAMILY MEDICINE
Payer: COMMERCIAL

## 2023-10-27 PROCEDURE — 87045 FECES CULTURE AEROBIC BACT: CPT

## 2023-10-27 PROCEDURE — 83993 ASSAY FOR CALPROTECTIN FECAL: CPT

## 2023-10-27 PROCEDURE — 87329 GIARDIA AG IA: CPT

## 2023-10-27 PROCEDURE — 87046 STOOL CULTR AEROBIC BACT EA: CPT

## 2023-10-27 PROCEDURE — 87899 AGENT NOS ASSAY W/OPTIC: CPT

## 2023-10-27 PROCEDURE — 87328 CRYPTOSPORIDIUM AG IA: CPT

## 2023-10-28 LAB
G LAMBLIA+C PARVUM AG STL QL RAPID: NORMAL
SIGNIFICANT IND 70042: NORMAL
SITE SITE: NORMAL
SOURCE SOURCE: NORMAL

## 2023-10-29 LAB
E COLI SXT1+2 STL IA: NORMAL
SIGNIFICANT IND 70042: NORMAL
SITE SITE: NORMAL
SOURCE SOURCE: NORMAL

## 2023-10-31 LAB
BACTERIA STL CULT: NORMAL
CALPROTECTIN STL-MCNT: 15 UG/G
E COLI SXT1+2 STL IA: NORMAL
SIGNIFICANT IND 70042: NORMAL
SITE SITE: NORMAL
SOURCE SOURCE: NORMAL

## 2023-11-10 ENCOUNTER — HOSPITAL ENCOUNTER (OUTPATIENT)
Dept: RADIOLOGY | Facility: MEDICAL CENTER | Age: 43
End: 2023-11-10
Attending: FAMILY MEDICINE
Payer: COMMERCIAL

## 2023-11-10 DIAGNOSIS — R10.11 ABDOMINAL PAIN, RIGHT UPPER QUADRANT: ICD-10-CM

## 2023-11-10 DIAGNOSIS — M54.50 ACUTE LOW BACK PAIN WITHOUT SCIATICA, UNSPECIFIED BACK PAIN LATERALITY: ICD-10-CM

## 2023-11-10 PROCEDURE — 76700 US EXAM ABDOM COMPLETE: CPT

## 2023-11-10 PROCEDURE — 72100 X-RAY EXAM L-S SPINE 2/3 VWS: CPT

## 2024-02-13 ENCOUNTER — HOSPITAL ENCOUNTER (OUTPATIENT)
Dept: LAB | Facility: MEDICAL CENTER | Age: 44
End: 2024-02-13
Attending: NURSE PRACTITIONER
Payer: COMMERCIAL

## 2024-02-13 LAB
ALBUMIN SERPL BCP-MCNC: 4.5 G/DL (ref 3.2–4.9)
ALBUMIN/GLOB SERPL: 1.8 G/DL
ALP SERPL-CCNC: 52 U/L (ref 30–99)
ALT SERPL-CCNC: 14 U/L (ref 2–50)
AMYLASE SERPL-CCNC: 59 U/L (ref 20–103)
ANION GAP SERPL CALC-SCNC: 9 MMOL/L (ref 7–16)
AST SERPL-CCNC: 17 U/L (ref 12–45)
BILIRUB SERPL-MCNC: 0.8 MG/DL (ref 0.1–1.5)
BUN SERPL-MCNC: 12 MG/DL (ref 8–22)
CALCIUM ALBUM COR SERPL-MCNC: 9.1 MG/DL (ref 8.5–10.5)
CALCIUM SERPL-MCNC: 9.5 MG/DL (ref 8.5–10.5)
CHLORIDE SERPL-SCNC: 103 MMOL/L (ref 96–112)
CO2 SERPL-SCNC: 27 MMOL/L (ref 20–33)
CREAT SERPL-MCNC: 1.06 MG/DL (ref 0.5–1.4)
GFR SERPLBLD CREATININE-BSD FMLA CKD-EPI: 89 ML/MIN/1.73 M 2
GLOBULIN SER CALC-MCNC: 2.5 G/DL (ref 1.9–3.5)
GLUCOSE SERPL-MCNC: 102 MG/DL (ref 65–99)
LIPASE SERPL-CCNC: 23 U/L (ref 11–82)
POTASSIUM SERPL-SCNC: 4.9 MMOL/L (ref 3.6–5.5)
PROT SERPL-MCNC: 7 G/DL (ref 6–8.2)
SODIUM SERPL-SCNC: 139 MMOL/L (ref 135–145)

## 2024-02-13 PROCEDURE — 83690 ASSAY OF LIPASE: CPT

## 2024-02-13 PROCEDURE — 82150 ASSAY OF AMYLASE: CPT

## 2024-02-13 PROCEDURE — 80053 COMPREHEN METABOLIC PANEL: CPT

## 2024-02-13 PROCEDURE — 36415 COLL VENOUS BLD VENIPUNCTURE: CPT

## 2024-04-03 ENCOUNTER — HOSPITAL ENCOUNTER (OUTPATIENT)
Dept: RADIOLOGY | Facility: MEDICAL CENTER | Age: 44
End: 2024-04-03
Attending: NURSE PRACTITIONER
Payer: COMMERCIAL

## 2024-04-03 DIAGNOSIS — R10.11 RUQ PAIN: ICD-10-CM

## 2024-04-03 PROCEDURE — A9537 TC99M MEBROFENIN: HCPCS

## (undated) DEVICE — SUTURE 4-0 VICRYL PLUS FS-2 - 27 INCH (36/BX)

## (undated) DEVICE — CHLORAPREP 26 ML APPLICATOR - ORANGE TINT(25/CA)

## (undated) DEVICE — GLOVE BIOGEL SZ 7.5 SURGICAL PF LTX - (50PR/BX 4BX/CA)

## (undated) DEVICE — DRESSING NON-ADHERING 8 X 3 - (50/BX)

## (undated) DEVICE — SUTURE GENERAL

## (undated) DEVICE — SUTURE 2-0 VICRYL PLUS CT-1 36 (36PK/BX)"

## (undated) DEVICE — STAPLE 45MM BLUE 3.5MM ECHELON (12EA/BX)

## (undated) DEVICE — SLEEVE, VASO, THIGH, MED

## (undated) DEVICE — BOVIE BLADE COATED &INSULATED - 25/PK

## (undated) DEVICE — KIT ANESTHESIA W/CIRCUIT & 3/LT BAG W/FILTER (20EA/CA)

## (undated) DEVICE — LACTATED RINGERS INJ 1000 ML - (14EA/CA 60CA/PF)

## (undated) DEVICE — CONNECTOR Y TBG CRTY 5 IN 1 STERILE (50EA/CA)

## (undated) DEVICE — TUBE ENDOBRONCHEAL 39FR (1/EA)

## (undated) DEVICE — SOD. CHL. INJ. 0.9% 1000 ML - (14EA/CA 60CA/PF)

## (undated) DEVICE — TOWEL STOP TIMEOUT SAFETY FLAG (40EA/CA)

## (undated) DEVICE — TUBING CLEARLINK DUO-VENT - C-FLO (48EA/CA)

## (undated) DEVICE — ANTI-FOG SOLUTION - 60BTL/CA

## (undated) DEVICE — DRAPE CHEST/BREAST - (12EA/CA)

## (undated) DEVICE — PROTECTOR ULNA NERVE - (36PR/CA)

## (undated) DEVICE — BAG RETRIEVAL 10ML (10EA/BX)

## (undated) DEVICE — GLOVE BIOGEL INDICATOR SZ 8 SURGICAL PF LTX - (50/BX 4BX/CA)

## (undated) DEVICE — GLOVE BIOGEL M SZ 8 SURGICAL PF LTX - (50/BX 4BX/CA)

## (undated) DEVICE — CANISTER SUCTION 3000ML MECHANICAL FILTER AUTO SHUTOFF MEDI-VAC NONSTERILE LF DISP  (40EA/CA)

## (undated) DEVICE — CONNECTOR HUBLESS DRAINAGE - ONE WAY (20/BX)

## (undated) DEVICE — Device

## (undated) DEVICE — SODIUM CHL IRRIGATION 0.9% 1000ML (12EA/CA)

## (undated) DEVICE — DRAPE IOBAN II INCISE 23X17 - (10EA/BX 4BX/CA)

## (undated) DEVICE — MASK ANESTHESIA ADULT  - (100/CA)

## (undated) DEVICE — NEPTUNE 4 PORT MANIFOLD - (20/PK)

## (undated) DEVICE — PACK LAP CHOLE OR - (2EA/CA)

## (undated) DEVICE — GLOVE BIOGEL INDICATOR SZ 7.5 SURGICAL PF LTX - (50PR/BX 4BX/CA)

## (undated) DEVICE — TROCAR 12MM THORACOPORT (6EA/BX)

## (undated) DEVICE — ENDO PEANUT 5MM DEVICE (12EA/BX)

## (undated) DEVICE — SET LEADWIRE 5 LEAD BEDSIDE DISPOSABLE ECG (1SET OF 5/EA)

## (undated) DEVICE — SENSOR SPO2 NEO LNCS ADHESIVE (20/BX) SEE USER NOTES

## (undated) DEVICE — DRAPESURG STERI-DRAPE LONG - (10/BX 4BX/CA)

## (undated) DEVICE — CLOSURE SKIN STRIP 1/2 X 4 IN - (STERI STRIP) (50/BX 4BX/CA)

## (undated) DEVICE — SUTURE 0 SILK CT-1 (36PK/BX)

## (undated) DEVICE — PACK MAJOR BASIN - (2EA/CA)

## (undated) DEVICE — ELECTRODE DUAL RETURN W/ CORD - (50/PK)

## (undated) DEVICE — STAPLER POWERED 45MM (3EA/BX)

## (undated) DEVICE — CATHETER THORACIC 28FR - W/ SENTINEL EYE (10/CA)

## (undated) DEVICE — GOWN WARMING STANDARD FLEX - (30/CA)

## (undated) DEVICE — SYRINGE 10 ML CONTROL LL (25EA/BX 4BX/CA)

## (undated) DEVICE — SUCTION INSTRUMENT YANKAUER BULBOUS TIP W/O VENT (50EA/CA)

## (undated) DEVICE — TROCAR SEPARATOR 5X55 - 6/BX

## (undated) DEVICE — GLOVE BIOGEL PI INDICATOR SZ 8.0 SURGICAL PF LF -(50/BX 4BX/CA)

## (undated) DEVICE — SENSOR OXIMETER ADULT SPO2 RD SET (20EA/BX)

## (undated) DEVICE — SPONGE GAUZESTER. 2X2 4-PL - (2/PK 50PK/BX 30BX/CS)

## (undated) DEVICE — NEEDLE NON SAFETY HYPO 22 GA X 1 1/2 IN (100/BX)

## (undated) DEVICE — SPONGE DRAIN 4 X 4IN 6-PLY - (2/PK25PK/BX12BX/CS)

## (undated) DEVICE — SET EXTENSION WITH 2 PORTS (48EA/CA) ***PART #2C8610 IS A SUBSTITUTE*****

## (undated) DEVICE — BLADE SURGICAL #11 - (50/BX)

## (undated) DEVICE — SET SUCTION/IRRIGATION WITH DISPOSABLE TIP (6/CA )PART #0250-070-520 IS A SUB

## (undated) DEVICE — HEAD HOLDER JUNIOR/ADULT

## (undated) DEVICE — SUTURE 2-0 VICRYL PLUS CT-2 - 27 INCH (36/BX)

## (undated) DEVICE — COVER LIGHT HANDLE ALC PLUS DISP (18EA/BX)

## (undated) DEVICE — DRESSING TRANSPARENT FILM TEGADERM 2.375 X 2.75"  (100EA/BX)"

## (undated) DEVICE — ELECTRODE 5MM LHK LAPSCP STERILE DISP- MEGADYNE  (5/CA)

## (undated) DEVICE — STAPLE 45MM WHTE 2.5MM - (12/BX)

## (undated) DEVICE — HEMOSTAT SURG ABSORBABLE - 4 X 8 IN SURGICEL (24EA/CA)

## (undated) DEVICE — ELECTRODE 850 FOAM ADHESIVE - HYDROGEL RADIOTRNSPRNT (50/PK)